# Patient Record
Sex: MALE | Race: BLACK OR AFRICAN AMERICAN | NOT HISPANIC OR LATINO | Employment: OTHER | URBAN - METROPOLITAN AREA
[De-identification: names, ages, dates, MRNs, and addresses within clinical notes are randomized per-mention and may not be internally consistent; named-entity substitution may affect disease eponyms.]

---

## 2017-11-10 ENCOUNTER — OFFICE VISIT (OUTPATIENT)
Dept: CARDIOLOGY | Facility: CLINIC | Age: 62
End: 2017-11-10
Payer: MEDICARE

## 2017-11-10 VITALS
BODY MASS INDEX: 41.52 KG/M2 | DIASTOLIC BLOOD PRESSURE: 78 MMHG | HEIGHT: 70 IN | HEART RATE: 66 BPM | SYSTOLIC BLOOD PRESSURE: 132 MMHG | WEIGHT: 290 LBS

## 2017-11-10 DIAGNOSIS — I25.2 OLD MI (MYOCARDIAL INFARCTION): ICD-10-CM

## 2017-11-10 DIAGNOSIS — E78.5 HYPERLIPIDEMIA, UNSPECIFIED HYPERLIPIDEMIA TYPE: ICD-10-CM

## 2017-11-10 DIAGNOSIS — Z98.61 S/P PTCA (PERCUTANEOUS TRANSLUMINAL CORONARY ANGIOPLASTY): ICD-10-CM

## 2017-11-10 DIAGNOSIS — I25.10 CORONARY ARTERY DISEASE INVOLVING NATIVE CORONARY ARTERY OF NATIVE HEART WITHOUT ANGINA PECTORIS: Primary | ICD-10-CM

## 2017-11-10 DIAGNOSIS — I10 ESSENTIAL HYPERTENSION: ICD-10-CM

## 2017-11-10 PROCEDURE — 99214 OFFICE O/P EST MOD 30 MIN: CPT | Mod: 25,S$GLB,, | Performed by: INTERNAL MEDICINE

## 2017-11-10 PROCEDURE — 99499 UNLISTED E&M SERVICE: CPT | Mod: S$GLB,,, | Performed by: INTERNAL MEDICINE

## 2017-11-10 PROCEDURE — 93000 ELECTROCARDIOGRAM COMPLETE: CPT | Mod: S$GLB,,, | Performed by: INTERNAL MEDICINE

## 2017-11-10 RX ORDER — ASPIRIN 81 MG/1
81 TABLET ORAL DAILY
COMMUNITY

## 2017-11-10 NOTE — PROGRESS NOTES
Subjective:   Patient ID:  Charles A Toussaint is a 62 y.o. male     Chief Complaint   Patient presents with    Pre-op Exam       HPI: Pt feels well .  Walks the dog.   Goes to [employer].    Pt is scheduled for skin cyst surgery by Dr Franklin  Review of Systems   Cardiovascular: Negative for chest pain, claudication, dyspnea on exertion, irregular heartbeat, leg swelling, near-syncope, orthopnea, palpitations and syncope.     Note that prior to last PTCA pt was having shortness of breath and jaw pain.     Pt had labs for [facility]  this week.   Objective:   Physical Exam   Constitutional: He is oriented to person, place, and time. He appears well-developed and well-nourished. No distress.   HENT:   Head: Normocephalic.   Eyes: No scleral icterus.   Neck: JVD present.   Cardiovascular: Normal rate, regular rhythm and normal heart sounds.  Exam reveals no gallop and no friction rub.    No murmur heard.  Pulmonary/Chest: Effort normal and breath sounds normal. No stridor.   Musculoskeletal: He exhibits no edema.   Neurological: He is alert and oriented to person, place, and time.   Skin: Skin is warm and dry. He is not diaphoretic.   Psychiatric: He has a normal mood and affect. His behavior is normal. Judgment and thought content normal.   Vitals reviewed.       Wt up 23 lbs.   ECG today--NSR, inferior infarct, low T waves.  Assessment:     1. Coronary artery disease involving native coronary artery of native heart without angina pectoris    2. Essential hypertension    3. Hyperlipidemia, unspecified hyperlipidemia type    4. Old MI (myocardial infarction)    5. S/P PTCA (percutaneous transluminal coronary angioplasty)        Plan:   Pt is medically stable for jaw cyst surgery  Discontinue Plavix since it has been 11 years since the placement of the last stent.  (1999 and 2006).  Rest of meds the same  It would be preferable to continue low dose ASA if possible in order to avoid stent  thrombosis.  RTC 6 months  Low carb diet explained in detail  Walk more

## 2017-11-13 NOTE — PROGRESS NOTES
Patient, Charles A Toussaint (MRN #[id number]), presented with a recorded BMI of 41.61 kg/m^2 consistent with the definition of morbid obesity (ICD-10 E66.01). The patient's morbid obesity was monitored, evaluated, addressed and/or treated. This addendum to the medical record is made on 11/13/2017.

## 2018-05-30 ENCOUNTER — OFFICE VISIT (OUTPATIENT)
Dept: CARDIOLOGY | Facility: CLINIC | Age: 63
End: 2018-05-30
Payer: MEDICARE

## 2018-05-30 VITALS
DIASTOLIC BLOOD PRESSURE: 78 MMHG | HEART RATE: 62 BPM | BODY MASS INDEX: 41.01 KG/M2 | WEIGHT: 286.5 LBS | HEIGHT: 70 IN | SYSTOLIC BLOOD PRESSURE: 132 MMHG

## 2018-05-30 DIAGNOSIS — I25.2 OLD MI (MYOCARDIAL INFARCTION): ICD-10-CM

## 2018-05-30 DIAGNOSIS — I25.10 CORONARY ARTERY DISEASE INVOLVING NATIVE CORONARY ARTERY OF NATIVE HEART WITHOUT ANGINA PECTORIS: ICD-10-CM

## 2018-05-30 DIAGNOSIS — E78.5 HYPERLIPIDEMIA, UNSPECIFIED HYPERLIPIDEMIA TYPE: ICD-10-CM

## 2018-05-30 DIAGNOSIS — Z98.61 S/P PTCA (PERCUTANEOUS TRANSLUMINAL CORONARY ANGIOPLASTY): ICD-10-CM

## 2018-05-30 DIAGNOSIS — I10 ESSENTIAL HYPERTENSION: Primary | ICD-10-CM

## 2018-05-30 PROCEDURE — 3008F BODY MASS INDEX DOCD: CPT | Mod: CPTII,S$GLB,, | Performed by: INTERNAL MEDICINE

## 2018-05-30 PROCEDURE — 99213 OFFICE O/P EST LOW 20 MIN: CPT | Mod: 25,S$GLB,, | Performed by: INTERNAL MEDICINE

## 2018-05-30 PROCEDURE — 3078F DIAST BP <80 MM HG: CPT | Mod: CPTII,S$GLB,, | Performed by: INTERNAL MEDICINE

## 2018-05-30 PROCEDURE — 93000 ELECTROCARDIOGRAM COMPLETE: CPT | Mod: S$GLB,,, | Performed by: INTERNAL MEDICINE

## 2018-05-30 PROCEDURE — 3075F SYST BP GE 130 - 139MM HG: CPT | Mod: CPTII,S$GLB,, | Performed by: INTERNAL MEDICINE

## 2018-05-30 RX ORDER — EZETIMIBE 10 MG
10 TABLET ORAL DAILY
Qty: 90 TABLET | Refills: 3 | Status: SHIPPED | OUTPATIENT
Start: 2018-05-30 | End: 2019-12-24

## 2018-05-30 RX ORDER — ATORVASTATIN CALCIUM 40 MG/1
40 TABLET, FILM COATED ORAL DAILY
COMMUNITY

## 2018-05-30 RX ORDER — PANTOPRAZOLE SODIUM 20 MG/1
20 TABLET, DELAYED RELEASE ORAL DAILY
Refills: 0 | COMMUNITY
Start: 2018-04-30

## 2018-05-30 RX ORDER — ATORVASTATIN CALCIUM 40 MG/1
TABLET, FILM COATED ORAL
Refills: 0 | COMMUNITY
Start: 2018-04-17 | End: 2018-05-30

## 2018-05-30 RX ORDER — CARVEDILOL 6.25 MG/1
6.25 TABLET ORAL 2 TIMES DAILY
Qty: 180 TABLET | Refills: 3 | Status: SHIPPED | OUTPATIENT
Start: 2018-05-30 | End: 2023-06-14

## 2018-05-30 RX ORDER — LOSARTAN POTASSIUM 50 MG/1
50 TABLET ORAL DAILY
Qty: 90 TABLET | Refills: 3 | Status: SHIPPED | OUTPATIENT
Start: 2018-05-30

## 2018-05-30 NOTE — PROGRESS NOTES
Patient, Charles A Toussaint (MRN #[id number]), presented with a recorded BMI of 41.11 kg/m^2 consistent with the definition of morbid obesity (ICD-10 E66.01). The patient's morbid obesity was monitored, evaluated, addressed and/or treated. This addendum to the medical record is made on 05/30/2018.

## 2018-05-30 NOTE — PROGRESS NOTES
"  Subjective:      Patient ID: Charles A Toussaint is a 63 y.o. male.    Chief Complaint: Follow-up    HPI:  [employer] graduated in [place].   Never had cyst surgery in jaw.  Active  Walks a lot  Mows the lawn.   Does yardwork.    Review of Systems   Cardiovascular: Negative for chest pain, claudication, dyspnea on exertion, irregular heartbeat, leg swelling, near-syncope, orthopnea, palpitations and syncope.      "I do not have the energy and motivation I used to have."    Snores    No daytime somnolence    Stopped smoking    Past Medical History:   Diagnosis Date    Coronary artery disease     Hyperlipidemia     Hypertension     MI (myocardial infarction)     1999        Past Surgical History:   Procedure Laterality Date    CARDIAC CATHETERIZATION      CORONARY ANGIOPLASTY      1999 at [place],  2006 at [facility] in [place]    HERNIA REPAIR Right     1990  inguinal       Family History   Problem Relation Age of Onset    Breast cancer Mother     Hypertension Sister     Heart disease Brother     Coronary artery disease Brother     Hypertension Brother        Social History     Social History    Marital status:      Spouse name: N/A    Number of children: N/A    Years of education: N/A     Social History Main Topics    Smoking status: Former Smoker     Packs/day: 0.25     Types: Cigarettes     Quit date: 10/10/2016    Smokeless tobacco: Never Used    Alcohol use 0.0 oz/week    Drug use: Unknown    Sexual activity: Not Asked     Other Topics Concern    None     Social History Narrative    None       Current Outpatient Prescriptions on File Prior to Visit   Medication Sig Dispense Refill    amlodipine (NORVASC) 5 MG tablet TK 1 T PO QD  2    aspirin (ECOTRIN) 81 MG EC tablet Take 81 mg by mouth once daily.      hydrocodone-acetaminophen 5-325mg (NORCO) 5-325 mg per tablet TK 1 T PO  Q 4 H PRN P  0    [DISCONTINUED] carvedilol (COREG) 6.25 MG tablet TK 1 T PO  BID.  3    " "[DISCONTINUED] losartan (COZAAR) 50 MG tablet Take 50 mg by mouth once daily.      [DISCONTINUED] ZETIA 10 mg tablet TK 1 T PO  QD.  3    [DISCONTINUED] atorvastatin (LIPITOR) 20 MG tablet TK 1 T PO  QD.  3    [DISCONTINUED] FLUVIRIN [id number] 45 mcg (15 mcg x 3)/0.5 mL Susp ADM 0.5ML IM UTD  0    [DISCONTINUED] pantoprazole (PROTONIX) 40 MG tablet TK 1 T PO QD  2     No current facility-administered medications on file prior to visit.        Review of patient's allergies indicates:  No Known Allergies  Objective:     Vitals:    05/30/18 1107   BP: 132/78   BP Location: Left arm   Patient Position: Sitting   BP Method: Large (Automatic)   Pulse: 62   Weight: 130 kg (286 lb 8 oz)   Height: 5' 10" (1.778 m)        Physical Exam   Constitutional: He is oriented to person, place, and time. He appears well-developed and well-nourished. No distress.   Eyes: No scleral icterus.   Neck: No JVD present. Carotid bruit is not present.   Cardiovascular: Regular rhythm and normal heart sounds.  Exam reveals no gallop and no friction rub.    No murmur heard.  Pulmonary/Chest: Effort normal and breath sounds normal. No respiratory distress.   Musculoskeletal: He exhibits no edema.   Neurological: He is alert and oriented to person, place, and time.   Skin: Skin is warm and dry. He is not diaphoretic.   Psychiatric: He has a normal mood and affect. His behavior is normal. Judgment and thought content normal.   Vitals reviewed.     Wt down 3.5 lbs  ECG: NSR, WNL  Assessment:     1. Essential hypertension    2. Coronary artery disease involving native coronary artery of native heart without angina pectoris    3. S/P PTCA (percutaneous transluminal coronary angioplasty)    4. Hyperlipidemia, unspecified hyperlipidemia type    5. Old MI (myocardial infarction)      Plan:   Brian was seen today for follow-up.    Diagnoses and all orders for this visit:    Essential hypertension    Coronary artery disease involving native coronary " artery of native heart without angina pectoris  -     EKG 12-lead    S/P PTCA (percutaneous transluminal coronary angioplasty)    Hyperlipidemia, unspecified hyperlipidemia type    Old MI (myocardial infarction)    Other orders  -     losartan (COZAAR) 50 MG tablet; Take 1 tablet (50 mg total) by mouth once daily.  -     carvedilol (COREG) 6.25 MG tablet; Take 1 tablet (6.25 mg total) by mouth 2 (two) times daily.  -     ZETIA 10 mg tablet; Take 1 tablet (10 mg total) by mouth once daily.     Same meds    Restrict carbs    Walk more in mall    Follow-up in about 6 months (around 11/30/2018).     F/u with dr Sosa who does labs

## 2019-08-21 ENCOUNTER — OFFICE VISIT (OUTPATIENT)
Dept: CARDIOLOGY | Facility: CLINIC | Age: 64
End: 2019-08-21
Payer: MEDICARE

## 2019-08-21 VITALS
BODY MASS INDEX: 41.23 KG/M2 | HEART RATE: 59 BPM | WEIGHT: 288 LBS | SYSTOLIC BLOOD PRESSURE: 133 MMHG | DIASTOLIC BLOOD PRESSURE: 79 MMHG | HEIGHT: 70 IN

## 2019-08-21 DIAGNOSIS — I10 ESSENTIAL HYPERTENSION: ICD-10-CM

## 2019-08-21 DIAGNOSIS — E78.00 PURE HYPERCHOLESTEROLEMIA: ICD-10-CM

## 2019-08-21 DIAGNOSIS — I25.2 OLD MI (MYOCARDIAL INFARCTION): ICD-10-CM

## 2019-08-21 DIAGNOSIS — I25.10 CORONARY ARTERY DISEASE INVOLVING NATIVE CORONARY ARTERY OF NATIVE HEART WITHOUT ANGINA PECTORIS: ICD-10-CM

## 2019-08-21 DIAGNOSIS — Z98.61 S/P PTCA (PERCUTANEOUS TRANSLUMINAL CORONARY ANGIOPLASTY): Primary | ICD-10-CM

## 2019-08-21 DIAGNOSIS — Z98.61 POST PTCA: ICD-10-CM

## 2019-08-21 DIAGNOSIS — E11.9 DIABETES MELLITUS WITHOUT COMPLICATION: ICD-10-CM

## 2019-08-21 DIAGNOSIS — I82.5Z2 CHRONIC DEEP VEIN THROMBOSIS (DVT) OF DISTAL VEIN OF LEFT LOWER EXTREMITY: ICD-10-CM

## 2019-08-21 PROBLEM — I82.5Z9 CHRONIC DEEP VEIN THROMBOSIS (DVT) OF DISTAL VEIN OF LOWER EXTREMITY: Status: ACTIVE | Noted: 2019-08-21

## 2019-08-21 PROCEDURE — 3075F PR MOST RECENT SYSTOLIC BLOOD PRESS GE 130-139MM HG: ICD-10-PCS | Mod: CPTII,S$GLB,, | Performed by: INTERNAL MEDICINE

## 2019-08-21 PROCEDURE — 3008F BODY MASS INDEX DOCD: CPT | Mod: CPTII,S$GLB,, | Performed by: INTERNAL MEDICINE

## 2019-08-21 PROCEDURE — 3008F PR BODY MASS INDEX (BMI) DOCUMENTED: ICD-10-PCS | Mod: CPTII,S$GLB,, | Performed by: INTERNAL MEDICINE

## 2019-08-21 PROCEDURE — 93000 ELECTROCARDIOGRAM COMPLETE: CPT | Mod: S$GLB,,, | Performed by: INTERNAL MEDICINE

## 2019-08-21 PROCEDURE — 99499 RISK ADDL DX/OHS AUDIT: ICD-10-PCS | Mod: S$GLB,,, | Performed by: INTERNAL MEDICINE

## 2019-08-21 PROCEDURE — 99214 OFFICE O/P EST MOD 30 MIN: CPT | Mod: S$GLB,,, | Performed by: INTERNAL MEDICINE

## 2019-08-21 PROCEDURE — 93000 EKG 12-LEAD: ICD-10-PCS | Mod: S$GLB,,, | Performed by: INTERNAL MEDICINE

## 2019-08-21 PROCEDURE — 99214 PR OFFICE/OUTPT VISIT, EST, LEVL IV, 30-39 MIN: ICD-10-PCS | Mod: S$GLB,,, | Performed by: INTERNAL MEDICINE

## 2019-08-21 PROCEDURE — 99499 UNLISTED E&M SERVICE: CPT | Mod: S$GLB,,, | Performed by: INTERNAL MEDICINE

## 2019-08-21 PROCEDURE — 3078F DIAST BP <80 MM HG: CPT | Mod: CPTII,S$GLB,, | Performed by: INTERNAL MEDICINE

## 2019-08-21 PROCEDURE — 3078F PR MOST RECENT DIASTOLIC BLOOD PRESSURE < 80 MM HG: ICD-10-PCS | Mod: CPTII,S$GLB,, | Performed by: INTERNAL MEDICINE

## 2019-08-21 PROCEDURE — 3075F SYST BP GE 130 - 139MM HG: CPT | Mod: CPTII,S$GLB,, | Performed by: INTERNAL MEDICINE

## 2019-08-21 RX ORDER — LATANOPROST 50 UG/ML
2 SOLUTION/ DROPS OPHTHALMIC
COMMUNITY

## 2019-08-21 RX ORDER — GABAPENTIN 300 MG/1
300 CAPSULE ORAL DAILY PRN
Refills: 2 | COMMUNITY
Start: 2019-08-09

## 2019-08-21 RX ORDER — METFORMIN HYDROCHLORIDE 500 MG/1
500 TABLET, EXTENDED RELEASE ORAL DAILY
Refills: 1 | COMMUNITY
Start: 2019-08-07

## 2019-08-21 RX ORDER — APIXABAN 5 MG/1
5 TABLET, FILM COATED ORAL 2 TIMES DAILY
Refills: 1 | COMMUNITY
Start: 2019-08-09 | End: 2020-01-15

## 2019-08-21 RX ORDER — IBUPROFEN 800 MG/1
TABLET ORAL
Refills: 0 | COMMUNITY
Start: 2019-07-11 | End: 2019-08-21

## 2019-08-21 NOTE — PROGRESS NOTES
Patient, Charles A Toussaint (MRN #[id number]), presented with a recorded BMI of 41.32 kg/m^2 consistent with the definition of morbid obesity (ICD-10 E66.01). The patient's morbid obesity was monitored, evaluated, addressed and/or treated. This addendum to the medical record is made on 08/21/2019.

## 2019-08-21 NOTE — PROGRESS NOTES
"  Subjective:      Patient ID: Charles A Toussaint is a 64 y.o. male.    Chief Complaint: Follow-up (Dx w/stage 3 colon cancer in August 2018)    HPI:  Pt had a partial colectomy for CA colon stage III and underwent chemotherapy.  Last scan showed no evidence of disease.    Pt has numbness of left anterior thigh ever since surgery    Pt has left sided sciatica worse in certain positions.     Pt was begun on gabapentin with not much relief.    Pt was referred to a neurosurgeon at [facility] for a pinched nerve.    Pt is able to walk "but it is painful in left buttock and left calf.    Pt went on disability in 1996 for back and disc problems.    Pt had blood clots in legs during chemotherapy and was put on Eliquis at [facility].    Pt developed chest pains and chemotherapy during chemotherapy and required another coronary stent at [facility].    Review of Systems   Cardiovascular: Positive for chest pain (Prior to most recent PTCA and stent at [facility].  No chest pain since last PTCA), dyspnea on exertion (Prior to last PTCA pt was short of breath but not since most recent PTCA) and leg swelling (Both feet were swollen at the time of blood clots in both legs butfeet are not swellin at the present time.). Negative for claudication, irregular heartbeat, near-syncope, orthopnea, palpitations and syncope.      New PCP is at [facility]--Dr Tootie Canales.  Past Medical History:   Diagnosis Date    Coronary artery disease     Hyperlipidemia     Hypertension     MI (myocardial infarction)     1999        Past Surgical History:   Procedure Laterality Date    CARDIAC CATHETERIZATION      CORONARY ANGIOPLASTY      1999 at [place],  2006 at [facility] in [place]    HERNIA REPAIR Right     1990  inguinal       Family History   Problem Relation Age of Onset    Breast cancer Mother     Hypertension Sister     Heart disease Brother     Coronary artery disease Brother     " Hypertension Brother        Social History     Socioeconomic History    Marital status:      Spouse name: Not on file    Number of children: Not on file    Years of education: Not on file    Highest education level: Not on file   Occupational History    Not on file   Social Needs    Financial resource strain: Not on file    Food insecurity:     Worry: Not on file     Inability: Not on file    Transportation needs:     Medical: Not on file     Non-medical: Not on file   Tobacco Use    Smoking status: Former Smoker     Packs/day: 0.25     Types: Cigarettes     Last attempt to quit: 10/10/2016     Years since quittin.8    Smokeless tobacco: Never Used   Substance and Sexual Activity    Alcohol use: Yes     Alcohol/week: 0.0 oz    Drug use: Not on file    Sexual activity: Not on file   Lifestyle    Physical activity:     Days per week: Not on file     Minutes per session: Not on file    Stress: Not on file   Relationships    Social connections:     Talks on phone: Not on file     Gets together: Not on file     Attends Oriental orthodox service: Not on file     Active member of club or organization: Not on file     Attends meetings of clubs or organizations: Not on file     Relationship status: Not on file   Other Topics Concern    Not on file   Social History Narrative    Not on file       Current Outpatient Medications on File Prior to Visit   Medication Sig Dispense Refill    amlodipine (NORVASC) 5 MG tablet TK 1 T PO QD  2    aspirin (ECOTRIN) 81 MG EC tablet Take 81 mg by mouth once daily.      atorvastatin (LIPITOR) 40 MG tablet Take 40 mg by mouth once daily.      carvedilol (COREG) 6.25 MG tablet Take 1 tablet (6.25 mg total) by mouth 2 (two) times daily. 180 tablet 3    ELIQUIS 5 mg Tab 5 mg 2 (two) times daily.   1    gabapentin (NEURONTIN) 300 MG capsule TK 2 CS PO TID FOR NERVE PAIN  2    latanoprost 0.005 % ophthalmic solution Apply 2 drops to eye.      losartan (COZAAR) 50 MG  "tablet Take 1 tablet (50 mg total) by mouth once daily. 90 tablet 3    metFORMIN (GLUCOPHAGE-XR) 500 MG 24 hr tablet once daily.  1    pantoprazole (PROTONIX) 20 MG tablet   0    ZETIA 10 mg tablet Take 1 tablet (10 mg total) by mouth once daily. 90 tablet 3    [DISCONTINUED] ibuprofen (ADVIL,MOTRIN) 800 MG tablet TK 1 T PO  Q 8 H PRN  0    [DISCONTINUED] hydrocodone-acetaminophen 5-325mg (NORCO) 5-325 mg per tablet TK 1 T PO  Q 4 H PRN P  0     No current facility-administered medications on file prior to visit.        Review of patient's allergies indicates:  No Known Allergies  Objective:     Vitals:    08/21/19 0838   BP: 133/79   BP Location: Left arm   Patient Position: Sitting   BP Method: Large (Automatic)   Pulse: (!) 59   Weight: 130.6 kg (288 lb)   Height: 5' 10" (1.778 m)        Physical Exam   Constitutional: He is oriented to person, place, and time. He appears well-developed and well-nourished. No distress.   Eyes: No scleral icterus.   Neck: No JVD present. Carotid bruit is not present.   Cardiovascular: Regular rhythm and normal heart sounds. Exam reveals no gallop and no friction rub.   No murmur heard.  Pulmonary/Chest: Effort normal and breath sounds normal. No respiratory distress.   Musculoskeletal: He exhibits edema (trace pitting pedal edema bilaterally).   Neurological: He is alert and oriented to person, place, and time.   Skin: Skin is warm and dry. He is not diaphoretic.   Psychiatric: He has a normal mood and affect. His behavior is normal. Judgment and thought content normal.   Vitals reviewed.  wt up 2 lbs over past year     ECG: sinus bradycardia at 57 bpm, early repolarization syndrome.    [facility] records reviewed.  Venous ultrasound in March showed left peroneal thrombus  CTA 2/2/19 showed no PE    Pt had POBA/PCI of in-stent restenosis of Left anterior descending coronary artery 2/5/19.   Pt was advised to take ASA forever and Plavix for a year.  The Plavix was " discontinued once pt was begun on Eliquis.  Pt was felt to possibly require brachytherapy in the future for in-stent overlapping stent thrombosis.    Note lab 8/1/19:  Hgb 12.7  plt 199  WBC 5.9  CMP WNL except glu 116  Assessment:     1. S/P PTCA (percutaneous transluminal coronary angioplasty)    2. Old MI (myocardial infarction)    3. Pure hypercholesterolemia    4. Essential hypertension    5. Coronary artery disease involving native coronary artery of native heart without angina pectoris    6. Diabetes mellitus without complication    7. Chronic deep vein thrombosis (DVT) of distal vein of left lower extremity    8. Post PTCA      Plan:   Brian was seen today for follow-up.    Diagnoses and all orders for this visit:    S/P PTCA (percutaneous transluminal coronary angioplasty)  -     IN OFFICE EKG 12-LEAD (to Muse)    Old MI (myocardial infarction)    Pure hypercholesterolemia    Essential hypertension    Coronary artery disease involving native coronary artery of native heart without angina pectoris    Diabetes mellitus without complication    Chronic deep vein thrombosis (DVT) of distal vein of left lower extremity    Post PTCA  -     IN OFFICE EKG 12-LEAD (to Muse)     Discontinue the ibuprofen and avoid all NSAID's due to risk of bleeding with Eliquis.   Substitute Tylenol prn.    Pt has an appt with neurosurgeon in September--Dr Surjit Yeung.    Discussed option of epidural steroid shots and physical therapy for left sciatica    Will ask oncologist the anticipated duration of Eliquis--Dr Parker Phillips--pt has appt 9/26/19.  Pt was placed on Eliquis back 2/19. Would discontinue the Eliquis if OK with Dr Phillips (If pt is not considered hypercoagulable).  If Eliquis is discontinued then pt should resume the Plavix along with the ASA through February of 2020    Note that if Eliquis is discontinued pt may be able to take NSAID's again for his sciatica    I have explained to pt that there is a risk of  "stent thrombosis and heart attack if the ASA (and Eliquis or Plavix) is held for submandibular mass surgery (elective, "it is not cancer") or for epidural steroid injections or for back surgery.  The risk of stent thrombosis and heart attack for any medically necessary procedure would be minimized if pt were able to take ASA perioperatively    I have discussed with pt the potential need for repeat stress test if chest pains or shortness of breath recur and potential need for repeat PTCA and/or brachytherapy.    Would do Lexiscan Cardiolite stress prior to any elective procedure.    ADA wt reducing diet discussed.  Pt has a new diet plan from dietician    Same meds for now    Follow up in about 3 months (around 11/21/2019).  "

## 2019-12-12 ENCOUNTER — TELEPHONE (OUTPATIENT)
Dept: CARDIOLOGY | Facility: CLINIC | Age: 64
End: 2019-12-12

## 2019-12-12 NOTE — TELEPHONE ENCOUNTER
Patient need to have a colonoscopy ASAP, does he need a Lexiscan prior to colonoscopy if so please order. Patient had an appointment for 12/13/19 but we had to R/S for 01/08/2020. Thanks.

## 2019-12-24 RX ORDER — EZETIMIBE 10 MG
TABLET ORAL
Qty: 90 TABLET | Refills: 3 | Status: SHIPPED | OUTPATIENT
Start: 2019-12-24

## 2020-01-15 ENCOUNTER — OFFICE VISIT (OUTPATIENT)
Dept: CARDIOLOGY | Facility: CLINIC | Age: 65
End: 2020-01-15
Payer: MEDICARE

## 2020-01-15 VITALS
HEART RATE: 69 BPM | BODY MASS INDEX: 44.09 KG/M2 | SYSTOLIC BLOOD PRESSURE: 125 MMHG | WEIGHT: 308 LBS | HEIGHT: 70 IN | DIASTOLIC BLOOD PRESSURE: 77 MMHG

## 2020-01-15 DIAGNOSIS — I25.10 CORONARY ARTERY DISEASE INVOLVING NATIVE CORONARY ARTERY OF NATIVE HEART WITHOUT ANGINA PECTORIS: ICD-10-CM

## 2020-01-15 DIAGNOSIS — I82.5Z9 CHRONIC DEEP VEIN THROMBOSIS (DVT) OF DISTAL VEIN OF LOWER EXTREMITY, UNSPECIFIED LATERALITY: ICD-10-CM

## 2020-01-15 DIAGNOSIS — E78.00 PURE HYPERCHOLESTEROLEMIA: ICD-10-CM

## 2020-01-15 DIAGNOSIS — Z98.61 S/P PTCA (PERCUTANEOUS TRANSLUMINAL CORONARY ANGIOPLASTY): Primary | ICD-10-CM

## 2020-01-15 DIAGNOSIS — Z98.61 POST PTCA: ICD-10-CM

## 2020-01-15 DIAGNOSIS — Z98.61 POST PTCA: Primary | ICD-10-CM

## 2020-01-15 DIAGNOSIS — I10 ESSENTIAL HYPERTENSION: ICD-10-CM

## 2020-01-15 DIAGNOSIS — E11.9 DIABETES MELLITUS WITHOUT COMPLICATION: ICD-10-CM

## 2020-01-15 DIAGNOSIS — I25.2 OLD MI (MYOCARDIAL INFARCTION): ICD-10-CM

## 2020-01-15 PROCEDURE — 3078F DIAST BP <80 MM HG: CPT | Mod: CPTII,S$GLB,, | Performed by: INTERNAL MEDICINE

## 2020-01-15 PROCEDURE — 3074F SYST BP LT 130 MM HG: CPT | Mod: CPTII,S$GLB,, | Performed by: INTERNAL MEDICINE

## 2020-01-15 PROCEDURE — 3074F PR MOST RECENT SYSTOLIC BLOOD PRESSURE < 130 MM HG: ICD-10-PCS | Mod: CPTII,S$GLB,, | Performed by: INTERNAL MEDICINE

## 2020-01-15 PROCEDURE — 3008F PR BODY MASS INDEX (BMI) DOCUMENTED: ICD-10-PCS | Mod: CPTII,S$GLB,, | Performed by: INTERNAL MEDICINE

## 2020-01-15 PROCEDURE — 99214 PR OFFICE/OUTPT VISIT, EST, LEVL IV, 30-39 MIN: ICD-10-PCS | Mod: 25,S$GLB,, | Performed by: INTERNAL MEDICINE

## 2020-01-15 PROCEDURE — 93000 ELECTROCARDIOGRAM COMPLETE: CPT | Mod: S$GLB,,, | Performed by: INTERNAL MEDICINE

## 2020-01-15 PROCEDURE — 3008F BODY MASS INDEX DOCD: CPT | Mod: CPTII,S$GLB,, | Performed by: INTERNAL MEDICINE

## 2020-01-15 PROCEDURE — 3078F PR MOST RECENT DIASTOLIC BLOOD PRESSURE < 80 MM HG: ICD-10-PCS | Mod: CPTII,S$GLB,, | Performed by: INTERNAL MEDICINE

## 2020-01-15 PROCEDURE — 99214 OFFICE O/P EST MOD 30 MIN: CPT | Mod: 25,S$GLB,, | Performed by: INTERNAL MEDICINE

## 2020-01-15 PROCEDURE — 93000 EKG 12-LEAD: ICD-10-PCS | Mod: S$GLB,,, | Performed by: INTERNAL MEDICINE

## 2020-01-15 RX ORDER — IBUPROFEN 800 MG/1
TABLET ORAL
COMMUNITY
Start: 2019-11-11

## 2020-01-15 NOTE — PROGRESS NOTES
Subjective:      Patient ID: Charles A Toussaint is a 64 y.o. male.    Chief Complaint: No chief complaint on file.    HPI:    ROS     Past Medical History:   Diagnosis Date    Coronary artery disease     Hyperlipidemia     Hypertension     MI (myocardial infarction)     1999        Past Surgical History:   Procedure Laterality Date    CARDIAC CATHETERIZATION      CORONARY ANGIOPLASTY      1999 at [place],  2006 at [facility] [place]    HERNIA REPAIR Right     1990  inguinal       Family History   Problem Relation Age of Onset    Breast cancer Mother     Hypertension Sister     Heart disease Brother     Coronary artery disease Brother     Hypertension Brother        Social History     Socioeconomic History    Marital status:      Spouse name: Not on file    Number of children: Not on file    Years of education: Not on file    Highest education level: Not on file   Occupational History    Not on file   Social Needs    Financial resource strain: Not on file    Food insecurity:     Worry: Not on file     Inability: Not on file    Transportation needs:     Medical: Not on file     Non-medical: Not on file   Tobacco Use    Smoking status: Former Smoker     Packs/day: 0.25     Types: Cigarettes     Last attempt to quit: 10/10/2016     Years since quitting: 3.2    Smokeless tobacco: Never Used   Substance and Sexual Activity    Alcohol use: Yes     Alcohol/week: 0.0 standard drinks    Drug use: Not on file    Sexual activity: Not on file   Lifestyle    Physical activity:     Days per week: Not on file     Minutes per session: Not on file    Stress: Not on file   Relationships    Social connections:     Talks on phone: Not on file     Gets together: Not on file     Attends Oriental orthodox service: Not on file     Active member of club or organization: Not on file     Attends meetings of clubs or organizations: Not on file     Relationship status: Not on file   Other Topics Concern    Not  on file   Social History Narrative    Not on file       Current Outpatient Medications on File Prior to Visit   Medication Sig Dispense Refill    amlodipine (NORVASC) 5 MG tablet TK 1 T PO QD  2    aspirin (ECOTRIN) 81 MG EC tablet Take 81 mg by mouth once daily.      atorvastatin (LIPITOR) 40 MG tablet Take 40 mg by mouth once daily.      carvedilol (COREG) 6.25 MG tablet Take 1 tablet (6.25 mg total) by mouth 2 (two) times daily. 180 tablet 3    ELIQUIS 5 mg Tab 5 mg 2 (two) times daily.   1    gabapentin (NEURONTIN) 300 MG capsule TK 2 CS PO TID FOR NERVE PAIN  2    ibuprofen (ADVIL,MOTRIN) 800 MG tablet       latanoprost 0.005 % ophthalmic solution Apply 2 drops to eye.      losartan (COZAAR) 50 MG tablet Take 1 tablet (50 mg total) by mouth once daily. 90 tablet 3    metFORMIN (GLUCOPHAGE-XR) 500 MG 24 hr tablet once daily.  1    pantoprazole (PROTONIX) 20 MG tablet   0    ZETIA 10 mg tablet TAKE 1 TABLET(10 MG) BY MOUTH EVERY DAY 90 tablet 3     No current facility-administered medications on file prior to visit.        Review of patient's allergies indicates:  No Known Allergies  Objective:   There were no vitals filed for this visit.     Physical Exam     Assessment:     1. Post PTCA      Plan:   Diagnoses and all orders for this visit:    Post PTCA  -     NM Myocardial Perfusion Spect Multi Pharmacologic; Future  -     Exercise Stress - EKG; Future         No follow-ups on file.

## 2020-01-15 NOTE — PROGRESS NOTES
"  Subjective:      Patient ID: Charles A Toussaint is a 64 y.o. male.    Chief Complaint: Pre-op Exam (Lipoma Excision w/biospy by Dr Leonor Mahmood. 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    Pt walks up and down 2 flights of steps twice a day to strengthen knees.    [place]    Colonoscopy was cancelled pending cardiac clearance.    Pt dx with colon cancer and colonoscopy is scheduled annually.    Pt is scheduling removal of chin lipoma by Dr Ela JOHNSTON at [facility] is requesting repeat stress test prior to colonoscopy    Review of Systems   Cardiovascular: Negative for chest pain, claudication, dyspnea on exertion, irregular heartbeat, leg swelling, near-syncope, orthopnea, palpitations and syncope.        Past Medical History:   Diagnosis Date    Coronary artery disease     Hyperlipidemia     Hypertension     MI (myocardial infarction)     1999        Past Surgical History:   Procedure Laterality Date    CARDIAC CATHETERIZATION      CORONARY ANGIOPLASTY      1999 at [place],  2006 at [facility] in [place]    HERNIA REPAIR Right     1990  inguinal       Family History   Problem Relation Age of Onset    Breast cancer Mother     Hypertension Sister     Heart disease Brother     Coronary artery disease Brother     Hypertension Brother        Social History     Socioeconomic History    Marital status:      Spouse name: Not on file    Number of children: Not on file    Years of education: Not on file    Highest education level: Not on file   Occupational History    Not on file   Social Needs    Financial resource strain: Not on file    Food insecurity:     Worry: Not on file     Inability: Not on file    Transportation needs:     " Medical: Not on file     Non-medical: Not on file   Tobacco Use    Smoking status: Former Smoker     Packs/day: 0.25     Types: Cigarettes     Last attempt to quit: 10/10/2016     Years since quitting: 3.2    Smokeless tobacco: Never Used   Substance and Sexual Activity    Alcohol use: Yes     Alcohol/week: 0.0 standard drinks    Drug use: Not on file    Sexual activity: Not on file   Lifestyle    Physical activity:     Days per week: Not on file     Minutes per session: Not on file    Stress: Not on file   Relationships    Social connections:     Talks on phone: Not on file     Gets together: Not on file     Attends Confucianism service: Not on file     Active member of club or organization: Not on file     Attends meetings of clubs or organizations: Not on file     Relationship status: Not on file   Other Topics Concern    Not on file   Social History Narrative    Not on file       Current Outpatient Medications on File Prior to Visit   Medication Sig Dispense Refill    amlodipine (NORVASC) 5 MG tablet TK 1 T PO QD  2    aspirin (ECOTRIN) 81 MG EC tablet Take 81 mg by mouth once daily.      atorvastatin (LIPITOR) 40 MG tablet Take 40 mg by mouth once daily.      carvedilol (COREG) 6.25 MG tablet Take 1 tablet (6.25 mg total) by mouth 2 (two) times daily. 180 tablet 3    gabapentin (NEURONTIN) 300 MG capsule TK 2 CS PO TID FOR NERVE PAIN  2    ibuprofen (ADVIL,MOTRIN) 800 MG tablet       latanoprost 0.005 % ophthalmic solution Apply 2 drops to eye.      losartan (COZAAR) 50 MG tablet Take 1 tablet (50 mg total) by mouth once daily. 90 tablet 3    metFORMIN (GLUCOPHAGE-XR) 500 MG 24 hr tablet once daily.  1    pantoprazole (PROTONIX) 20 MG tablet   0    ZETIA 10 mg tablet TAKE 1 TABLET(10 MG) BY MOUTH EVERY DAY 90 tablet 3    [DISCONTINUED] ELIQUIS 5 mg Tab 5 mg 2 (two) times daily.   1     No current facility-administered medications on file prior to visit.        Review of patient's allergies  "indicates:  No Known Allergies  Objective:     Vitals:    01/15/20 0945   BP: 125/77   BP Location: Left arm   Patient Position: Sitting   BP Method: Large (Automatic)   Pulse: 69   Weight: (!) 139.7 kg (308 lb)   Height: 5' 10" (1.778 m)        Physical Exam   Constitutional: He is oriented to person, place, and time. He appears well-developed and well-nourished. No distress.   Eyes: No scleral icterus.   Neck: No JVD present. Carotid bruit is not present.   Cardiovascular: Regular rhythm and normal heart sounds. Exam reveals no gallop and no friction rub.   No murmur heard.  Pulmonary/Chest: Effort normal and breath sounds normal. No respiratory distress.   Musculoskeletal: He exhibits no edema.   Neurological: He is alert and oriented to person, place, and time.   Skin: Skin is warm and dry. He is not diaphoretic.   Psychiatric: He has a normal mood and affect. His behavior is normal. Judgment and thought content normal.   Vitals reviewed.     Wt up 20 lbs since 8/19    ECG: NSR, small Q waves in inferior leads, possible LVH, ST segment elevation due to early repolarization      Assessment:     1. S/P PTCA (percutaneous transluminal coronary angioplasty)    2. Old MI (myocardial infarction)    3. Pure hypercholesterolemia    4. Essential hypertension    5. Coronary artery disease involving native coronary artery of native heart without angina pectoris    6. Chronic deep vein thrombosis (DVT) of distal vein of lower extremity, unspecified laterality    7. Diabetes mellitus without complication      Plan:   Brian was seen today for pre-op exam.    Diagnoses and all orders for this visit:    S/P PTCA (percutaneous transluminal coronary angioplasty)    Old MI (myocardial infarction)    Pure hypercholesterolemia    Essential hypertension    Coronary artery disease involving native coronary artery of native heart without angina pectoris    Chronic deep vein thrombosis (DVT) of distal vein of lower extremity, " unspecified laterality    Diabetes mellitus without complication    Other orders  -     IN OFFICE EKG 12-LEAD (to [place])     Will proceed with Lexiscan Cardiolite stress test prior to elective chin surgery to remove lipoma.    Pt is medically stable for colonoscopy and facial surgery by Dr Mahmood    There is a small risk of stent thrombosis and heart attack if the ASA  is held for procedures which has to be weighed against the benefit of the procedures and the risk of bleeding if the procedures are performed while taking anti-platelet meds.    Pt completed his course of Eliquis for DVT      Low carb weight reducing diet discussed in detail    Walk more    Follow up in about 6 months (around 7/15/2020).

## 2020-01-15 NOTE — PROGRESS NOTES
Patient, Charles A Toussaint (MRN #[id number]), presented with a recorded BMI of 44.19 kg/m^2 consistent with the definition of morbid obesity (ICD-10 E66.01). The patient's morbid obesity was monitored, evaluated, addressed and/or treated. This addendum to the medical record is made on 01/15/2020.

## 2020-01-27 ENCOUNTER — TELEPHONE (OUTPATIENT)
Dept: CARDIOLOGY | Facility: CLINIC | Age: 65
End: 2020-01-27

## 2020-01-27 NOTE — TELEPHONE ENCOUNTER
Reached out to patient want to cancel his lexiscan stress test due to colonoscopy being cancelled due to out of network.    ----- Message from Brandy Connelly sent at 1/27/2020  3:39 PM CST -----  Contact: [phone]/self  Patient is requesting to speak with you regarding rescheduling a procedure. Please advise.

## 2020-03-05 DIAGNOSIS — Z98.61 POSTSURGICAL PERCUTANEOUS TRANSLUMINAL CORONARY ANGIOPLASTY STATUS: Primary | ICD-10-CM

## 2020-03-06 ENCOUNTER — HOSPITAL ENCOUNTER (OUTPATIENT)
Dept: RADIOLOGY | Facility: OTHER | Age: 65
Discharge: HOME | End: 2020-03-06
Attending: INTERNAL MEDICINE
Payer: MEDICARE

## 2020-03-06 ENCOUNTER — HOSPITAL ENCOUNTER (OUTPATIENT)
Dept: CARDIOLOGY | Facility: OTHER | Age: 65
Discharge: HOME | End: 2020-03-06
Attending: INTERNAL MEDICINE
Payer: MEDICARE

## 2020-03-06 DIAGNOSIS — Z98.61 POST PTCA: ICD-10-CM

## 2020-03-12 DIAGNOSIS — Z98.61 POSTSURGICAL PERCUTANEOUS TRANSLUMINAL CORONARY ANGIOPLASTY STATUS: Primary | ICD-10-CM

## 2020-03-13 ENCOUNTER — HOSPITAL ENCOUNTER (OUTPATIENT)
Dept: CARDIOLOGY | Facility: OTHER | Age: 65
Discharge: HOME | End: 2020-03-13
Attending: INTERNAL MEDICINE
Payer: MEDICARE

## 2020-03-13 ENCOUNTER — TELEPHONE (OUTPATIENT)
Dept: CARDIOLOGY | Facility: CLINIC | Age: 65
End: 2020-03-13

## 2020-03-13 ENCOUNTER — HOSPITAL ENCOUNTER (OUTPATIENT)
Dept: RADIOLOGY | Facility: OTHER | Age: 65
Discharge: HOME | End: 2020-03-13
Attending: INTERNAL MEDICINE
Payer: MEDICARE

## 2020-03-13 VITALS
SYSTOLIC BLOOD PRESSURE: 111 MMHG | HEIGHT: 70 IN | WEIGHT: 308 LBS | BODY MASS INDEX: 44.09 KG/M2 | HEART RATE: 55 BPM | DIASTOLIC BLOOD PRESSURE: 69 MMHG

## 2020-03-13 DIAGNOSIS — Z98.61 POSTSURGICAL PERCUTANEOUS TRANSLUMINAL CORONARY ANGIOPLASTY STATUS: ICD-10-CM

## 2020-03-13 LAB
CV PHARM DOSE: 0.4 MG
CV STRESS BASE HR: 55 BPM
DIASTOLIC BLOOD PRESSURE: 69 MMHG
OHS CV CPX 85 PERCENT MAX PREDICTED HEART RATE MALE: 134
OHS CV CPX MAX PREDICTED HEART RATE: 158
OHS CV CPX PATIENT IS FEMALE: 0
OHS CV CPX PATIENT IS MALE: 1
OHS CV CPX PEAK DIASTOLIC BLOOD PRESSURE: 73 MMHG
OHS CV CPX PEAK HEAR RATE: 80 BPM
OHS CV CPX PEAK RATE PRESSURE PRODUCT: 9600
OHS CV CPX PEAK SYSTOLIC BLOOD PRESSURE: 120 MMHG
OHS CV CPX PERCENT MAX PREDICTED HEART RATE ACHIEVED: 51
OHS CV CPX RATE PRESSURE PRODUCT PRESENTING: 6105
STRESS ECHO TARGET HR: 134 BPM
SYSTOLIC BLOOD PRESSURE: 111 MMHG

## 2020-03-13 PROCEDURE — 78452 NM MYOCARDIAL PERFUSION SPECT MULTI PHARM: ICD-10-PCS | Mod: 26,,, | Performed by: RADIOLOGY

## 2020-03-13 PROCEDURE — 78452 HT MUSCLE IMAGE SPECT MULT: CPT | Mod: 26,,, | Performed by: RADIOLOGY

## 2020-03-13 NOTE — TELEPHONE ENCOUNTER
Message left on voicemail:  Nuclear stress test is perfect.  No blockage.  Heart is strong.          (Patient is medically stable for colonoscopy.)

## 2020-12-30 ENCOUNTER — NURSE TRIAGE (OUTPATIENT)
Dept: ADMINISTRATIVE | Facility: CLINIC | Age: 65
End: 2020-12-30

## 2020-12-31 NOTE — TELEPHONE ENCOUNTER
Spoke with family: pt with fever earlier this am was 100.6F. this evening is 102.2F. no CP. Is able to walk around. No confusion,slurring or words or do=ifficulty staying awake. + urinary s/s. Last night had frequency. Took 2 x strength tylenol 1730-- reports mild lightheadedness 2 hrs ago was worse.  Pt with urinary s/s, fever. instructed pt to go to ED for evaluation. Wife verbalizes understanding.     Reason for Disposition   [1] Fever > 101 F (38.3 C) AND [2] age > 60    Additional Information   Negative: Bluish (or gray) lips or face now   Negative: Severe difficulty breathing (e.g., struggling for each breath, speaks in single words)   Negative: Sounds like a life-threatening emergency to the triager   Negative: [1] Difficulty breathing occurs AND [2] within 14 days of COVID-19 EXPOSURE (Close Contact)   Negative: Shock suspected (e.g., cold/pale/clammy skin, too weak to stand, low BP, rapid pulse)   Negative: Difficult to awaken or acting confused (e.g., disoriented, slurred speech)   Negative: [1] Difficulty breathing AND [2] bluish lips, tongue or face   Negative: New onset rash with multiple purple (or blood-colored) spots or dots   Negative: Sounds like a life-threatening emergency to the triager   Negative: Difficulty breathing   Negative: [1] Headache AND [2] stiff neck (can't touch chin to chest)   Negative: IV drug abuse   Negative: Fever > 104 F (40 C)   Negative: [1] Drinking very little AND [2] dehydration suspected (e.g., no urine > 12 hours, very dry mouth, very lightheaded)   Negative: Patient sounds very sick or weak to the triager  (Exception: mild weakness and hasn't taken fever medicine)    Protocols used: FEVER-A-AH, CORONAVIRUS (COVID-19) - EXPOSURE-A-AH

## 2021-04-22 ENCOUNTER — TELEPHONE (OUTPATIENT)
Dept: CARDIOLOGY | Facility: CLINIC | Age: 66
End: 2021-04-22

## 2021-05-03 ENCOUNTER — TELEPHONE (OUTPATIENT)
Dept: CARDIOLOGY | Facility: CLINIC | Age: 66
End: 2021-05-03

## 2021-05-03 ENCOUNTER — OFFICE VISIT (OUTPATIENT)
Dept: CARDIOLOGY | Facility: CLINIC | Age: 66
End: 2021-05-03
Payer: MEDICARE

## 2021-05-03 VITALS
HEIGHT: 70 IN | WEIGHT: 301.13 LBS | BODY MASS INDEX: 43.11 KG/M2 | HEART RATE: 72 BPM | DIASTOLIC BLOOD PRESSURE: 68 MMHG | SYSTOLIC BLOOD PRESSURE: 122 MMHG | OXYGEN SATURATION: 96 %

## 2021-05-03 DIAGNOSIS — I25.10 CORONARY ARTERY DISEASE INVOLVING NATIVE CORONARY ARTERY OF NATIVE HEART WITHOUT ANGINA PECTORIS: Primary | ICD-10-CM

## 2021-05-03 DIAGNOSIS — Z98.61 S/P PTCA (PERCUTANEOUS TRANSLUMINAL CORONARY ANGIOPLASTY): ICD-10-CM

## 2021-05-03 DIAGNOSIS — I10 ESSENTIAL HYPERTENSION: ICD-10-CM

## 2021-05-03 DIAGNOSIS — I25.2 OLD MI (MYOCARDIAL INFARCTION): ICD-10-CM

## 2021-05-03 DIAGNOSIS — I82.5Z9 CHRONIC DEEP VEIN THROMBOSIS (DVT) OF DISTAL VEIN OF LOWER EXTREMITY, UNSPECIFIED LATERALITY: ICD-10-CM

## 2021-05-03 DIAGNOSIS — E11.9 DIABETES MELLITUS WITHOUT COMPLICATION: ICD-10-CM

## 2021-05-03 DIAGNOSIS — E78.5 HYPERLIPIDEMIA, UNSPECIFIED HYPERLIPIDEMIA TYPE: ICD-10-CM

## 2021-05-03 PROCEDURE — 99999 PR PBB SHADOW E&M-EST. PATIENT-LVL III: CPT | Mod: PBBFAC,,, | Performed by: INTERNAL MEDICINE

## 2021-05-03 PROCEDURE — 99214 PR OFFICE/OUTPT VISIT, EST, LEVL IV, 30-39 MIN: ICD-10-PCS | Mod: 25,S$GLB,, | Performed by: INTERNAL MEDICINE

## 2021-05-03 PROCEDURE — 99999 PR PBB SHADOW E&M-EST. PATIENT-LVL III: ICD-10-PCS | Mod: PBBFAC,,, | Performed by: INTERNAL MEDICINE

## 2021-05-03 PROCEDURE — 1126F PR PAIN SEVERITY QUANTIFIED, NO PAIN PRESENT: ICD-10-PCS | Mod: S$GLB,,, | Performed by: INTERNAL MEDICINE

## 2021-05-03 PROCEDURE — 3008F PR BODY MASS INDEX (BMI) DOCUMENTED: ICD-10-PCS | Mod: CPTII,S$GLB,, | Performed by: INTERNAL MEDICINE

## 2021-05-03 PROCEDURE — 99214 OFFICE O/P EST MOD 30 MIN: CPT | Mod: 25,S$GLB,, | Performed by: INTERNAL MEDICINE

## 2021-05-03 PROCEDURE — 3008F BODY MASS INDEX DOCD: CPT | Mod: CPTII,S$GLB,, | Performed by: INTERNAL MEDICINE

## 2021-05-03 PROCEDURE — 93000 ELECTROCARDIOGRAM COMPLETE: CPT | Mod: S$GLB,,, | Performed by: INTERNAL MEDICINE

## 2021-05-03 PROCEDURE — 93000 EKG 12-LEAD: ICD-10-PCS | Mod: S$GLB,,, | Performed by: INTERNAL MEDICINE

## 2021-05-03 PROCEDURE — 1126F AMNT PAIN NOTED NONE PRSNT: CPT | Mod: S$GLB,,, | Performed by: INTERNAL MEDICINE

## 2021-05-03 RX ORDER — HYDROCHLOROTHIAZIDE 12.5 MG/1
12.5 CAPSULE ORAL EVERY MORNING
COMMUNITY
Start: 2021-03-25

## 2021-05-03 RX ORDER — TADALAFIL 20 MG/1
20 TABLET ORAL NIGHTLY
COMMUNITY
Start: 2021-03-19 | End: 2021-11-08 | Stop reason: SDUPTHER

## 2021-05-03 RX ORDER — ALBUTEROL SULFATE 90 UG/1
2 AEROSOL, METERED RESPIRATORY (INHALATION) EVERY 4 HOURS PRN
COMMUNITY
Start: 2021-03-18

## 2021-05-03 RX ORDER — ACETAMINOPHEN 500 MG
1000 TABLET ORAL
COMMUNITY
Start: 2020-12-31 | End: 2021-12-31

## 2021-05-03 RX ORDER — FLUTICASONE PROPIONATE 50 MCG
SPRAY, SUSPENSION (ML) NASAL
COMMUNITY
Start: 2020-12-26

## 2021-07-01 ENCOUNTER — PATIENT MESSAGE (OUTPATIENT)
Dept: ADMINISTRATIVE | Facility: OTHER | Age: 66
End: 2021-07-01

## 2021-11-03 ENCOUNTER — TELEPHONE (OUTPATIENT)
Dept: CARDIOLOGY | Facility: CLINIC | Age: 66
End: 2021-11-03
Payer: MEDICARE

## 2021-11-08 ENCOUNTER — TELEPHONE (OUTPATIENT)
Dept: CARDIOLOGY | Facility: CLINIC | Age: 66
End: 2021-11-08

## 2021-11-08 ENCOUNTER — OFFICE VISIT (OUTPATIENT)
Dept: CARDIOLOGY | Facility: CLINIC | Age: 66
End: 2021-11-08
Payer: MEDICARE

## 2021-11-08 VITALS
SYSTOLIC BLOOD PRESSURE: 125 MMHG | HEIGHT: 70 IN | BODY MASS INDEX: 43.58 KG/M2 | WEIGHT: 304.44 LBS | OXYGEN SATURATION: 99 % | DIASTOLIC BLOOD PRESSURE: 62 MMHG | HEART RATE: 71 BPM

## 2021-11-08 DIAGNOSIS — I25.2 OLD MI (MYOCARDIAL INFARCTION): ICD-10-CM

## 2021-11-08 DIAGNOSIS — E78.5 HYPERLIPIDEMIA, UNSPECIFIED HYPERLIPIDEMIA TYPE: ICD-10-CM

## 2021-11-08 DIAGNOSIS — R60.0 LOCALIZED EDEMA: Primary | ICD-10-CM

## 2021-11-08 DIAGNOSIS — I82.5Z9 CHRONIC DEEP VEIN THROMBOSIS (DVT) OF DISTAL VEIN OF LOWER EXTREMITY, UNSPECIFIED LATERALITY: ICD-10-CM

## 2021-11-08 DIAGNOSIS — Z98.61 POST PTCA: ICD-10-CM

## 2021-11-08 DIAGNOSIS — I10 ESSENTIAL HYPERTENSION: ICD-10-CM

## 2021-11-08 DIAGNOSIS — I25.10 CORONARY ARTERY DISEASE INVOLVING NATIVE CORONARY ARTERY OF NATIVE HEART WITHOUT ANGINA PECTORIS: ICD-10-CM

## 2021-11-08 DIAGNOSIS — Z98.61 S/P PTCA (PERCUTANEOUS TRANSLUMINAL CORONARY ANGIOPLASTY): ICD-10-CM

## 2021-11-08 DIAGNOSIS — E11.9 DIABETES MELLITUS WITHOUT COMPLICATION: ICD-10-CM

## 2021-11-08 PROCEDURE — 99214 PR OFFICE/OUTPT VISIT, EST, LEVL IV, 30-39 MIN: ICD-10-PCS | Mod: 25,S$GLB,, | Performed by: INTERNAL MEDICINE

## 2021-11-08 PROCEDURE — 1159F PR MEDICATION LIST DOCUMENTED IN MEDICAL RECORD: ICD-10-PCS | Mod: CPTII,S$GLB,, | Performed by: INTERNAL MEDICINE

## 2021-11-08 PROCEDURE — 99214 OFFICE O/P EST MOD 30 MIN: CPT | Mod: 25,S$GLB,, | Performed by: INTERNAL MEDICINE

## 2021-11-08 PROCEDURE — 3078F DIAST BP <80 MM HG: CPT | Mod: CPTII,S$GLB,, | Performed by: INTERNAL MEDICINE

## 2021-11-08 PROCEDURE — 99999 PR PBB SHADOW E&M-EST. PATIENT-LVL III: ICD-10-PCS | Mod: PBBFAC,,, | Performed by: INTERNAL MEDICINE

## 2021-11-08 PROCEDURE — 93000 EKG 12-LEAD: ICD-10-PCS | Mod: S$GLB,,, | Performed by: INTERNAL MEDICINE

## 2021-11-08 PROCEDURE — 3008F PR BODY MASS INDEX (BMI) DOCUMENTED: ICD-10-PCS | Mod: CPTII,S$GLB,, | Performed by: INTERNAL MEDICINE

## 2021-11-08 PROCEDURE — 99999 PR PBB SHADOW E&M-EST. PATIENT-LVL III: CPT | Mod: PBBFAC,,, | Performed by: INTERNAL MEDICINE

## 2021-11-08 PROCEDURE — 3078F PR MOST RECENT DIASTOLIC BLOOD PRESSURE < 80 MM HG: ICD-10-PCS | Mod: CPTII,S$GLB,, | Performed by: INTERNAL MEDICINE

## 2021-11-08 PROCEDURE — 1159F MED LIST DOCD IN RCRD: CPT | Mod: CPTII,S$GLB,, | Performed by: INTERNAL MEDICINE

## 2021-11-08 PROCEDURE — 1160F RVW MEDS BY RX/DR IN RCRD: CPT | Mod: CPTII,S$GLB,, | Performed by: INTERNAL MEDICINE

## 2021-11-08 PROCEDURE — 3074F PR MOST RECENT SYSTOLIC BLOOD PRESSURE < 130 MM HG: ICD-10-PCS | Mod: CPTII,S$GLB,, | Performed by: INTERNAL MEDICINE

## 2021-11-08 PROCEDURE — 93000 ELECTROCARDIOGRAM COMPLETE: CPT | Mod: S$GLB,,, | Performed by: INTERNAL MEDICINE

## 2021-11-08 PROCEDURE — 4010F PR ACE/ARB THEARPY RXD/TAKEN: ICD-10-PCS | Mod: CPTII,S$GLB,, | Performed by: INTERNAL MEDICINE

## 2021-11-08 PROCEDURE — 3074F SYST BP LT 130 MM HG: CPT | Mod: CPTII,S$GLB,, | Performed by: INTERNAL MEDICINE

## 2021-11-08 PROCEDURE — 3008F BODY MASS INDEX DOCD: CPT | Mod: CPTII,S$GLB,, | Performed by: INTERNAL MEDICINE

## 2021-11-08 PROCEDURE — 1160F PR REVIEW ALL MEDS BY PRESCRIBER/CLIN PHARMACIST DOCUMENTED: ICD-10-PCS | Mod: CPTII,S$GLB,, | Performed by: INTERNAL MEDICINE

## 2021-11-08 PROCEDURE — 4010F ACE/ARB THERAPY RXD/TAKEN: CPT | Mod: CPTII,S$GLB,, | Performed by: INTERNAL MEDICINE

## 2021-11-08 RX ORDER — TADALAFIL 20 MG/1
20 TABLET ORAL NIGHTLY
Qty: 10 TABLET | Refills: 11 | Status: SHIPPED | OUTPATIENT
Start: 2021-11-08 | End: 2023-06-14 | Stop reason: SDUPTHER

## 2023-05-24 ENCOUNTER — TELEPHONE (OUTPATIENT)
Dept: CARDIOLOGY | Facility: CLINIC | Age: 68
End: 2023-05-24
Payer: MEDICARE

## 2023-05-24 NOTE — TELEPHONE ENCOUNTER
Spoke with patient.  Appointment rescheduled.        ----- Message from Kavita Kelley sent at 5/24/2023 12:42 PM CDT -----  Pt has an appt today but states that he will not be able to make it at that time . He would like to reschedule appt for a f/u EKG. Can be reached at [phone]

## 2023-06-14 ENCOUNTER — OFFICE VISIT (OUTPATIENT)
Dept: CARDIOLOGY | Facility: CLINIC | Age: 68
End: 2023-06-14
Payer: MEDICARE

## 2023-06-14 VITALS
HEIGHT: 70 IN | BODY MASS INDEX: 45.1 KG/M2 | HEART RATE: 79 BPM | SYSTOLIC BLOOD PRESSURE: 131 MMHG | OXYGEN SATURATION: 97 % | DIASTOLIC BLOOD PRESSURE: 72 MMHG | WEIGHT: 315 LBS

## 2023-06-14 DIAGNOSIS — E78.5 HYPERLIPIDEMIA, UNSPECIFIED HYPERLIPIDEMIA TYPE: ICD-10-CM

## 2023-06-14 DIAGNOSIS — I25.10 CORONARY ARTERY DISEASE INVOLVING NATIVE CORONARY ARTERY OF NATIVE HEART WITHOUT ANGINA PECTORIS: ICD-10-CM

## 2023-06-14 DIAGNOSIS — E11.9 DIABETES MELLITUS WITHOUT COMPLICATION: ICD-10-CM

## 2023-06-14 DIAGNOSIS — Z98.61 S/P PTCA (PERCUTANEOUS TRANSLUMINAL CORONARY ANGIOPLASTY): ICD-10-CM

## 2023-06-14 DIAGNOSIS — I25.2 OLD MI (MYOCARDIAL INFARCTION): ICD-10-CM

## 2023-06-14 DIAGNOSIS — I10 ESSENTIAL HYPERTENSION: Primary | ICD-10-CM

## 2023-06-14 DIAGNOSIS — I82.5Z9 CHRONIC DEEP VEIN THROMBOSIS (DVT) OF DISTAL VEIN OF LOWER EXTREMITY, UNSPECIFIED LATERALITY: ICD-10-CM

## 2023-06-14 DIAGNOSIS — E66.01 MORBID OBESITY: ICD-10-CM

## 2023-06-14 PROCEDURE — 3288F FALL RISK ASSESSMENT DOCD: CPT | Mod: CPTII,S$GLB,, | Performed by: INTERNAL MEDICINE

## 2023-06-14 PROCEDURE — 1159F MED LIST DOCD IN RCRD: CPT | Mod: CPTII,S$GLB,, | Performed by: INTERNAL MEDICINE

## 2023-06-14 PROCEDURE — 3078F DIAST BP <80 MM HG: CPT | Mod: CPTII,S$GLB,, | Performed by: INTERNAL MEDICINE

## 2023-06-14 PROCEDURE — 1126F PR PAIN SEVERITY QUANTIFIED, NO PAIN PRESENT: ICD-10-PCS | Mod: CPTII,S$GLB,, | Performed by: INTERNAL MEDICINE

## 2023-06-14 PROCEDURE — 93000 ELECTROCARDIOGRAM COMPLETE: CPT | Mod: S$GLB,,, | Performed by: INTERNAL MEDICINE

## 2023-06-14 PROCEDURE — 1126F AMNT PAIN NOTED NONE PRSNT: CPT | Mod: CPTII,S$GLB,, | Performed by: INTERNAL MEDICINE

## 2023-06-14 PROCEDURE — 3008F BODY MASS INDEX DOCD: CPT | Mod: CPTII,S$GLB,, | Performed by: INTERNAL MEDICINE

## 2023-06-14 PROCEDURE — 3288F PR FALLS RISK ASSESSMENT DOCUMENTED: ICD-10-PCS | Mod: CPTII,S$GLB,, | Performed by: INTERNAL MEDICINE

## 2023-06-14 PROCEDURE — 1160F RVW MEDS BY RX/DR IN RCRD: CPT | Mod: CPTII,S$GLB,, | Performed by: INTERNAL MEDICINE

## 2023-06-14 PROCEDURE — 99999 PR PBB SHADOW E&M-EST. PATIENT-LVL III: CPT | Mod: PBBFAC,,, | Performed by: INTERNAL MEDICINE

## 2023-06-14 PROCEDURE — 1159F PR MEDICATION LIST DOCUMENTED IN MEDICAL RECORD: ICD-10-PCS | Mod: CPTII,S$GLB,, | Performed by: INTERNAL MEDICINE

## 2023-06-14 PROCEDURE — 3008F PR BODY MASS INDEX (BMI) DOCUMENTED: ICD-10-PCS | Mod: CPTII,S$GLB,, | Performed by: INTERNAL MEDICINE

## 2023-06-14 PROCEDURE — 4010F ACE/ARB THERAPY RXD/TAKEN: CPT | Mod: CPTII,S$GLB,, | Performed by: INTERNAL MEDICINE

## 2023-06-14 PROCEDURE — 3075F SYST BP GE 130 - 139MM HG: CPT | Mod: CPTII,S$GLB,, | Performed by: INTERNAL MEDICINE

## 2023-06-14 PROCEDURE — 1101F PR PT FALLS ASSESS DOC 0-1 FALLS W/OUT INJ PAST YR: ICD-10-PCS | Mod: CPTII,S$GLB,, | Performed by: INTERNAL MEDICINE

## 2023-06-14 PROCEDURE — 3078F PR MOST RECENT DIASTOLIC BLOOD PRESSURE < 80 MM HG: ICD-10-PCS | Mod: CPTII,S$GLB,, | Performed by: INTERNAL MEDICINE

## 2023-06-14 PROCEDURE — 99999 PR PBB SHADOW E&M-EST. PATIENT-LVL III: ICD-10-PCS | Mod: PBBFAC,,, | Performed by: INTERNAL MEDICINE

## 2023-06-14 PROCEDURE — 99213 OFFICE O/P EST LOW 20 MIN: CPT | Mod: 25,S$GLB,, | Performed by: INTERNAL MEDICINE

## 2023-06-14 PROCEDURE — 99213 PR OFFICE/OUTPT VISIT, EST, LEVL III, 20-29 MIN: ICD-10-PCS | Mod: 25,S$GLB,, | Performed by: INTERNAL MEDICINE

## 2023-06-14 PROCEDURE — 93000 EKG 12-LEAD: ICD-10-PCS | Mod: S$GLB,,, | Performed by: INTERNAL MEDICINE

## 2023-06-14 PROCEDURE — 1101F PT FALLS ASSESS-DOCD LE1/YR: CPT | Mod: CPTII,S$GLB,, | Performed by: INTERNAL MEDICINE

## 2023-06-14 PROCEDURE — 1160F PR REVIEW ALL MEDS BY PRESCRIBER/CLIN PHARMACIST DOCUMENTED: ICD-10-PCS | Mod: CPTII,S$GLB,, | Performed by: INTERNAL MEDICINE

## 2023-06-14 PROCEDURE — 3075F PR MOST RECENT SYSTOLIC BLOOD PRESS GE 130-139MM HG: ICD-10-PCS | Mod: CPTII,S$GLB,, | Performed by: INTERNAL MEDICINE

## 2023-06-14 PROCEDURE — 4010F PR ACE/ARB THEARPY RXD/TAKEN: ICD-10-PCS | Mod: CPTII,S$GLB,, | Performed by: INTERNAL MEDICINE

## 2023-06-14 RX ORDER — TADALAFIL 20 MG/1
20 TABLET ORAL NIGHTLY
Qty: 10 TABLET | Refills: 11 | Status: SHIPPED | OUTPATIENT
Start: 2023-06-14

## 2023-06-14 NOTE — PROGRESS NOTES
Subjective:      Patient ID: Charles A Toussaint is a 68 y.o. male.    Chief Complaint: Follow-up    HPI:  Feels OK    [place] [facility]    Review of Systems   Cardiovascular:  Negative for chest pain, claudication, dyspnea on exertion, irregular heartbeat, leg swelling, near-syncope, orthopnea, palpitations and syncope.      Pt has chronic pain in knees      Past Medical History:   Diagnosis Date    Coronary artery disease     Hyperlipidemia     Hypertension     MI (myocardial infarction)             Past Surgical History:   Procedure Laterality Date    CARDIAC CATHETERIZATION      CORONARY ANGIOPLASTY       at [place],   at [facility] in [place]    HERNIA REPAIR Right     1990  inguinal       Family History   Problem Relation Age of Onset    Breast cancer Mother     Hypertension Sister     Heart disease Brother     Coronary artery disease Brother     Hypertension Brother        Social History     Socioeconomic History    Marital status:    Tobacco Use    Smoking status: Former     Packs/day: 0.25     Types: Cigarettes     Quit date: 10/10/2016     Years since quittin.6    Smokeless tobacco: Never   Substance and Sexual Activity    Alcohol use: Yes     Alcohol/week: 0.0 standard drinks       Current Outpatient Medications on File Prior to Visit   Medication Sig Dispense Refill    albuterol (PROVENTIL/VENTOLIN HFA) 90 mcg/actuation inhaler Inhale 2 puffs into the lungs every 4 (four) hours as needed.      amlodipine (NORVASC) 5 MG tablet TK 1 T PO QD  2    aspirin (ECOTRIN) 81 MG EC tablet Take 81 mg by mouth once daily.      atorvastatin (LIPITOR) 40 MG tablet Take 40 mg by mouth once daily.      fluticasone propionate (FLONASE) 50 mcg/actuation nasal spray       gabapentin (NEURONTIN) 300 MG capsule 300 mg daily as needed.  2    hydroCHLOROthiazide (MICROZIDE) 12.5 mg capsule Take 12.5 mg by mouth every morning.      ibuprofen (ADVIL,MOTRIN) 800 MG tablet as needed.     "   latanoprost 0.005 % ophthalmic solution Apply 2 drops to eye.      losartan (COZAAR) 50 MG tablet Take 1 tablet (50 mg total) by mouth once daily. 90 tablet 3    metFORMIN (GLUCOPHAGE-XR) 500 MG 24 hr tablet Take 500 mg by mouth once daily.  1    pantoprazole (PROTONIX) 20 MG tablet Take 20 mg by mouth once daily.  0    ZETIA 10 mg tablet TAKE 1 TABLET(10 MG) BY MOUTH EVERY DAY 90 tablet 3    [DISCONTINUED] tadalafiL (CIALIS) 20 MG Tab Take 1 tablet (20 mg total) by mouth nightly. 10 tablet 11    [DISCONTINUED] carvedilol (COREG) 6.25 MG tablet Take 1 tablet (6.25 mg total) by mouth 2 (two) times daily. (Patient not taking: Reported on 6/14/2023) 180 tablet 3     No current facility-administered medications on file prior to visit.       Review of patient's allergies indicates:  No Known Allergies  Objective:     Vitals:    06/14/23 1502   BP: 131/72   BP Location: Left arm   Patient Position: Sitting   BP Method: Large (Automatic)   Pulse: 79   SpO2: 97%   Weight: (!) 143.1 kg (315 lb 5.9 oz)   Height: 5' 10" (1.778 m)        Physical Exam  Constitutional:       General: He is not in acute distress.     Appearance: He is well-developed. He is obese. He is not diaphoretic.   Eyes:      General: No scleral icterus.  Neck:      Vascular: No carotid bruit or JVD.   Cardiovascular:      Rate and Rhythm: Regular rhythm.      Heart sounds: Normal heart sounds. No murmur heard.    No friction rub. No gallop.   Pulmonary:      Effort: Pulmonary effort is normal. No respiratory distress.      Breath sounds: Normal breath sounds.   Musculoskeletal:      Right lower leg: Edema present.      Left lower leg: Edema (trivial bilateral) present.   Skin:     General: Skin is warm and dry.   Neurological:      Mental Status: He is alert and oriented to person, place, and time.   Psychiatric:         Behavior: Behavior normal.         Thought Content: Thought content normal.         Judgment: Judgment normal.          ECG today: " NSR, WNL    No visits with results within 6 Month(s) from this visit.   Latest known visit with results is:   Hospital Outpatient Visit on 03/13/2020   Component Date Value Ref Range Status    Target HR 03/13/2020 134  bpm Final    HR at rest 03/13/2020 55.0  bpm Final    Systolic blood pressure 03/13/2020 111.0  mmHg Final    Diastolic blood pressure 03/13/2020 69.0  mmHg Final    RPP 03/13/2020 6,105   Final    85% Max Predicted HR 03/13/2020 134   Final    Max Predicted HR 03/13/2020 158   Final    [facility] CV CPX PATIENT IS MALE 03/13/2020 1   Final    [facility] CV CPX PATIENT IS FEMALE 03/13/2020 0   Final    dose 03/13/2020 0.4  mg Final    Peak HR 03/13/2020 80.0  bpm Final    Peak Systolic BP 03/13/2020 120.0  mmHg Final    Peak Diatolic BP 03/13/2020 73.0  mmHg Final    Peak RPP 03/13/2020 9,600   Final    % Max HR Achieved 03/13/2020 51   Final   (  Encounter Form Printed    Encounter form printed on 03/06/20 07:51 AM         NM Myocardial Perfusion Spect Multi Pharmacologic  Status: Final result     [facility] Results Release    [facility] Status: Active  Results Release     PACS Images for [facility] Viewer     Show images for NM Myocardial Perfusion Spect Multi Pharmacologic  All Reviewers List    Neymar Montgomery MD on 3/13/2020 10:05     NM Myocardial Perfusion Spect Multi Pharmacologic  Order: [id number]  Status: Final result      Visible to patient: Yes (not seen)       Next appt: None      Dx: Post PTCA       0 Result Notes      Details    Reading Physician Reading Date Result Priority   Sterling Rosales MD  [phone] 3/13/2020 Routine   Nael Bar MD  [phone] 3/13/2020      Narrative & Impression  EXAMINATION:  NM MYOCARDIAL PERFUSION SPECT MULTI PHARM     CLINICAL HISTORY:  Prior revascularization (either PTCA or CABG);  Coronary angioplasty status     TECHNIQUE:  SPECT images in short, vertical and horizontal long axis were acquired after the injection of 10.2 mCi of Tc-99m  tetrofosmin at rest and 32.4 mCi during a cardiac stress. The clinical stress and ECG portion of the study is to be read separately.     COMPARISON:  None.     FINDINGS:  The quality of the study is decent noting some mild adjacent GI activity.     Stress SPECT images demonstrate homogenous distribution of the tracer throughout the left ventricle. On the resting images, there is matched homogenous distribution of the tracer throughout the left ventricle.     The gated post-stress images reveal normal wall motion and normal wall thickening with an estimated LVEF of 59 %. The LV cavity is not dilated with an end-diastolic volume of 97 ml and an end-systolic volume of 39 ml.     Impression:     1.  Scintigraphically negative for ischemia or infarct.  2. the global left ventricular systolic function is normal with an LV ejection fraction of 59 % and no evidence of LV dilatation. Wall motion is normal.        Electronically signed by: Nael Bar MD  Date:                                            03/13/2020  Time:                                           09:53               Exam Ended: 03/13/20 08:49 Last Resulted: 03/13/20 09:53            Order: [id number]   Ref Range & Units 3 wk ago   Sodium 135 - 146 mmol/L 140    Potassium 3.6 - 5.2 mmol/L 3.9    Chloride 96 - 110 mmol/L 106    Carbon Dioxide 24 - 32 mmol/L 26    Glucose 65 - 99 mg/dL 103 High     Calcium 8.4 - 10.3 mg/dL 9.9    BUN 7.0 - 25.0 mg/dL 14.0    Creatinine 0.70 - 1.40 mg/dL 1.14    Total Protein 6.0 - 8.0 g/dL 7.8    Albumin 3.4 - 5.0 g/dL 4.2    AST <45 U/L 21    ALT <46 U/L 24    Alkaline Phosphatase 20 - 120 U/L 29    Bilirubin, Total <1.3 mg/dL 0.6    EGFR >=90 mL/min 71 Low     Comment: Calculation based on the Chronic Kidney Disease Epidemiology Collaboration (CKD-EPI) equation refit without adjustment for race.   Resulting Agency      [facility] (Acc# [id number]) (Order [id number])   MyChart Results Release    MyChart Status:  Active  Results Release       [facility] Information       Contains abnormal data HEMOGLOBIN A1C  Order: [id number]  Status: Final result    Next appt: None           Component Ref Range & Units 3 wk ago 10 mo ago 1 yr ago   Hemoglobin A1C 4.7 - 5.6 % 7.4 High   6.8 High   7.3 High     Estimated Average Glucose <115 mg/dL 166 High   148 High   163 High     Resulting Agency  [facility]           Narrative  Performed by: [facility]  Hemoglobin A1c Interpretative Guide   Hemoglobin A1c Result  Interpretation   4.7%-5.6% Normal Reference Range   5.7%-6.4% Increased Risk for Diabetes   >6.4%      Diagnostic of Diabetes   <7.0%      Adult Glycemic Control Target      Specimen Collected: 05/18/23 12:47 Last Resulted: 05/18/23 13:45   Received From: [facility] Results Release    MyChart Status: Active  Results Release       [facility]  Outside Information       suggestion  Information displayed in this report may not trend or trigger automated decision support.      Contains abnormal data CBC with Differential  Order: [id number]   Ref Range & Units 3 wk ago   WBC 4.5 - 11.0 103/uL 6.4    RBC 4.50 - 5.90 106/uL 4.63    Hemoglobin 13.5 - 17.5 gm/dL 13.4 Low     Hematocrit 40.0 - 51.0 % 39.2 Low     MCV 80.0 - 100.0 fL 84.6    MCH 26.0 - 34.0 pg 29.0    MCHC 31.0 - 37.0 g/dL 34.3    RDW 11.5 - 14.5 % 14.8 High     Platelet Count 130 - 400 103/uL 208    MPV 7.4 - 10.4 fL 7.8    Resulting Agency  [facility]   Narrative  Performed by [facility]  Result Reviewed  Specimen Collected: 05/18/23 12:47 Last     Assessment:     1. Essential hypertension    2. Hyperlipidemia, unspecified hyperlipidemia type    3. Coronary artery disease involving native coronary artery of native heart without angina pectoris    4. Old MI (myocardial infarction)    5. S/P PTCA (percutaneous transluminal coronary angioplasty)    6. Chronic deep vein thrombosis (DVT) of distal vein of lower  extremity, unspecified laterality    7. Diabetes mellitus without complication    8. Morbid obesity      Plan:   Brian was seen today for follow-up.    Diagnoses and all orders for this visit:    Essential hypertension  -     IN OFFICE EKG 12-LEAD (to [place])  -     CBC Auto Differential; Future  -     Comprehensive Metabolic Panel; Future  -     Lipid Panel; Future  -     TSH; Future    Hyperlipidemia, unspecified hyperlipidemia type  -     IN OFFICE EKG 12-LEAD (to [place])  -     CBC Auto Differential; Future  -     Comprehensive Metabolic Panel; Future  -     Lipid Panel; Future  -     TSH; Future    Coronary artery disease involving native coronary artery of native heart without angina pectoris  -     IN OFFICE EKG 12-LEAD (to [place])  -     CBC Auto Differential; Future  -     Comprehensive Metabolic Panel; Future  -     Lipid Panel; Future  -     TSH; Future    Old MI (myocardial infarction)  -     IN OFFICE EKG 12-LEAD (to [place])  -     CBC Auto Differential; Future  -     Comprehensive Metabolic Panel; Future  -     Lipid Panel; Future  -     TSH; Future    S/P PTCA (percutaneous transluminal coronary angioplasty)  -     IN OFFICE EKG 12-LEAD (to [place])  -     CBC Auto Differential; Future  -     Comprehensive Metabolic Panel; Future  -     Lipid Panel; Future  -     TSH; Future    Chronic deep vein thrombosis (DVT) of distal vein of lower extremity, unspecified laterality  -     IN OFFICE EKG 12-LEAD (to [place])  -     CBC Auto Differential; Future  -     Comprehensive Metabolic Panel; Future  -     Lipid Panel; Future  -     TSH; Future    Diabetes mellitus without complication  -     IN OFFICE EKG 12-LEAD (to [place])  -     CBC Auto Differential; Future  -     Comprehensive Metabolic Panel; Future  -     Lipid Panel; Future  -     TSH; Future    Morbid obesity  -     CBC Auto Differential; Future  -     Comprehensive Metabolic Panel; Future  -     Lipid Panel; Future  -     TSH; Future    Other orders  -      tadalafiL (CIALIS) 20 MG Tab; Take 1 tablet (20 mg total) by mouth nightly.     Pt is doing well    Trace venous stasis edema due to BMI 45, amlodipine, gabapentin, ibuprofen    No sugar, low carb diet discussed    Same meds    F/u with PCP.  Consider Ozempic    F/u with Dr Aguilar (oncologist ) for h/o colon cancer (now in remission)    No follow-ups on file.

## 2024-01-18 ENCOUNTER — OFFICE VISIT (OUTPATIENT)
Dept: CARDIOLOGY | Facility: CLINIC | Age: 69
End: 2024-01-18
Payer: MEDICARE

## 2024-01-18 VITALS
HEIGHT: 70 IN | OXYGEN SATURATION: 98 % | BODY MASS INDEX: 42.81 KG/M2 | WEIGHT: 299.06 LBS | HEART RATE: 68 BPM | DIASTOLIC BLOOD PRESSURE: 68 MMHG | SYSTOLIC BLOOD PRESSURE: 128 MMHG

## 2024-01-18 DIAGNOSIS — I25.10 CORONARY ARTERY DISEASE INVOLVING NATIVE CORONARY ARTERY OF NATIVE HEART WITHOUT ANGINA PECTORIS: Primary | ICD-10-CM

## 2024-01-18 DIAGNOSIS — I10 ESSENTIAL HYPERTENSION: ICD-10-CM

## 2024-01-18 DIAGNOSIS — I82.5Z9 CHRONIC DEEP VEIN THROMBOSIS (DVT) OF DISTAL VEIN OF LOWER EXTREMITY, UNSPECIFIED LATERALITY: ICD-10-CM

## 2024-01-18 DIAGNOSIS — E78.5 HYPERLIPIDEMIA, UNSPECIFIED HYPERLIPIDEMIA TYPE: ICD-10-CM

## 2024-01-18 PROCEDURE — 99999 PR PBB SHADOW E&M-EST. PATIENT-LVL IV: CPT | Mod: PBBFAC,,, | Performed by: INTERNAL MEDICINE

## 2024-01-18 PROCEDURE — 99204 OFFICE O/P NEW MOD 45 MIN: CPT | Mod: S$GLB,,, | Performed by: INTERNAL MEDICINE

## 2024-01-18 RX ORDER — METOPROLOL SUCCINATE 25 MG/1
25 TABLET, EXTENDED RELEASE ORAL DAILY
Qty: 90 TABLET | Refills: 3 | Status: SHIPPED | OUTPATIENT
Start: 2024-01-18 | End: 2025-01-17

## 2024-01-18 NOTE — PROGRESS NOTES
[facility] - Cardiology [address]  Cardiology Clinic Note      Chief Complaint  Chief Complaint   Patient presents with    Follow-up       HPI:      68-year-old male with past medical history colon cancer (CapOx), hypertension, hyperlipidemia, diabetes, DVT peroneal, coronary artery disease status post PCI 1999 and 2006 last treated ISR of LAD 2019 MPI 2020 no defect normal estimated EF,     Lipids at goal but not on beta-blocker   asymptomatic     EKG 06/2023 normal sinus rhythm consider LVH    Medications  Current Outpatient Medications   Medication Sig Dispense Refill    albuterol (PROVENTIL/VENTOLIN HFA) 90 mcg/actuation inhaler Inhale 2 puffs into the lungs every 4 (four) hours as needed.      aspirin (ECOTRIN) 81 MG EC tablet Take 81 mg by mouth once daily.      atorvastatin (LIPITOR) 40 MG tablet Take 40 mg by mouth once daily.      gabapentin (NEURONTIN) 300 MG capsule 300 mg daily as needed.  2    hydroCHLOROthiazide (MICROZIDE) 12.5 mg capsule Take 12.5 mg by mouth every morning.      losartan (COZAAR) 50 MG tablet Take 1 tablet (50 mg total) by mouth once daily. 90 tablet 3    metFORMIN (GLUCOPHAGE-XR) 500 MG 24 hr tablet Take 500 mg by mouth once daily.  1    pantoprazole (PROTONIX) 20 MG tablet Take 20 mg by mouth once daily.  0    ZETIA 10 mg tablet TAKE 1 TABLET(10 MG) BY MOUTH EVERY DAY 90 tablet 3    fluticasone propionate (FLONASE) 50 mcg/actuation nasal spray       ibuprofen (ADVIL,MOTRIN) 800 MG tablet as needed.       latanoprost 0.005 % ophthalmic solution Apply 2 drops to eye.      metoprolol succinate (TOPROL-XL) 25 MG 24 hr tablet Take 1 tablet (25 mg total) by mouth once daily. 90 tablet 3    tadalafiL (CIALIS) 20 MG Tab Take 1 tablet (20 mg total) by mouth nightly. 10 tablet 11     No current facility-administered medications for this visit.        History  Past Medical History:   Diagnosis Date    Coronary artery disease     Hyperlipidemia     Hypertension     MI (myocardial infarction)           Past Surgical History:   Procedure Laterality Date    CARDIAC CATHETERIZATION      CORONARY ANGIOPLASTY       at [place],  2006 at [facility] in [place]    HERNIA REPAIR Right     1990  inguinal     Social History     Socioeconomic History    Marital status:    Tobacco Use    Smoking status: Former     Current packs/day: 0.00     Types: Cigarettes     Quit date: 10/10/2016     Years since quittin.2    Smokeless tobacco: Never   Substance and Sexual Activity    Alcohol use: Yes     Alcohol/week: 0.0 standard drinks of alcohol     Family History   Problem Relation Age of Onset    Breast cancer Mother     Hypertension Sister     Heart disease Brother     Coronary artery disease Brother     Hypertension Brother         Allergies  Review of patient's allergies indicates:  No Known Allergies    Review of Systems   Review of Systems   Constitutional: Negative for fever.   HENT:  Negative for nosebleeds.    Eyes:  Negative for visual disturbance.   Cardiovascular:  Negative for chest pain, claudication, dyspnea on exertion, palpitations and syncope.   Respiratory:  Negative for cough, hemoptysis and wheezing.    Endocrine: Negative for cold intolerance, heat intolerance, polyphagia and polyuria.   Hematologic/Lymphatic: Negative for bleeding problem.   Skin:  Negative for rash.   Musculoskeletal:  Negative for myalgias.   Gastrointestinal:  Negative for hematemesis, hematochezia, nausea and vomiting.   Genitourinary:  Negative for dysuria.   Neurological:  Negative for focal weakness and sensory change.   Psychiatric/Behavioral:  Negative for altered mental status.        Physical Exam  Vitals:    24 1428   BP: 128/68   Pulse: 68     Wt Readings from Last 1 Encounters:   24 135.6 kg (299 lb 0.9 oz)     Physical Exam  HENT:      Head: Normocephalic and atraumatic.      Mouth/Throat:      Mouth: Mucous membranes are moist.   Eyes:      Extraocular Movements: Extraocular movements  intact.      Pupils: Pupils are equal, round, and reactive to light.   Neck:      Vascular: No carotid bruit or JVD.   Cardiovascular:      Rate and Rhythm: Normal rate and regular rhythm.      Pulses: Normal pulses and intact distal pulses.      Heart sounds: Normal heart sounds. No murmur heard.     No friction rub. No gallop.   Pulmonary:      Effort: Pulmonary effort is normal.      Breath sounds: Normal breath sounds.   Abdominal:      Tenderness: There is no abdominal tenderness. There is no guarding or rebound.   Musculoskeletal:      Right lower leg: No edema.      Left lower leg: No edema.   Skin:     General: Skin is warm and dry.      Capillary Refill: Capillary refill takes less than 2 seconds.   Neurological:      General: No focal deficit present.      Mental Status: He is alert and oriented to person, place, and time.   Psychiatric:         Mood and Affect: Mood normal.         Labs  No visits with results within 6 Month(s) from this visit.   Latest known visit with results is:   Hospital Outpatient Visit on 03/13/2020   Component Date Value Ref Range Status    Target HR 03/13/2020 134  bpm Final    HR at rest 03/13/2020 55.0  bpm Final    Systolic blood pressure 03/13/2020 111.0  mmHg Final    Diastolic blood pressure 03/13/2020 69.0  mmHg Final    RPP 03/13/2020 6,105   Final    85% Max Predicted HR 03/13/2020 134   Final    Max Predicted HR 03/13/2020 158   Final    [facility] CV CPX PATIENT IS MALE 03/13/2020 1   Final    [facility] CV CPX PATIENT IS FEMALE 03/13/2020 0   Final    dose 03/13/2020 0.4  mg Final    Peak HR 03/13/2020 80.0  bpm Final    Peak Systolic BP 03/13/2020 120.0  mmHg Final    Peak Diatolic BP 03/13/2020 73.0  mmHg Final    Peak RPP 03/13/2020 9,600   Final    % Max HR Achieved 03/13/2020 51   Final       EKG   See above    Echo   No results found for this or any previous visit.    Imaging  No results found.    Prior coronary angiogram / intervention:   See above    Assessment and Plan  1.  Coronary artery disease involving native coronary artery of native heart without angina pectoris   Continue aspirin with high-dose statin Zetia add beta-blocker  - metoprolol succinate (TOPROL-XL) 25 MG 24 hr tablet; Take 1 tablet (25 mg total) by mouth once daily.  Dispense: 90 tablet; Refill: 3    2. Essential hypertension   Continue hydrochlorothiazide, losartan   Discontinue amlodipine and start metoprolol    3. Hyperlipidemia, unspecified hyperlipidemia type   Continue statin Zetia    4. Chronic deep vein thrombosis (DVT) of distal vein of lower extremity, unspecified laterality   Appears to have been below-the-knee and he received anticoagulation for 3 months   Consider repeating ultrasound        Follow Up  Follow up in about 1 month (around 2/18/2024).      Simeon Newton MD, FACC, [facility]  Interventional Cardiology     Total professional time spent for the encounter: 45 minutes  Time was spent preparing to see the patient, reviewing results of prior testing, obtaining and/or reviewing separately obtained history, performing a medically appropriate examination and interview, counseling and educating the patient/family, ordering medications/tests/procedures, referring and communicating with other health care professionals, documenting clinical information in the electronic health record, and independently interpreting results.

## 2024-03-18 ENCOUNTER — OFFICE VISIT (OUTPATIENT)
Dept: CARDIOLOGY | Facility: CLINIC | Age: 69
End: 2024-03-18
Payer: MEDICARE

## 2024-03-18 VITALS
DIASTOLIC BLOOD PRESSURE: 67 MMHG | HEIGHT: 70 IN | HEART RATE: 68 BPM | OXYGEN SATURATION: 97 % | WEIGHT: 294.56 LBS | SYSTOLIC BLOOD PRESSURE: 135 MMHG | BODY MASS INDEX: 42.17 KG/M2

## 2024-03-18 DIAGNOSIS — Z98.61 S/P PTCA (PERCUTANEOUS TRANSLUMINAL CORONARY ANGIOPLASTY): ICD-10-CM

## 2024-03-18 DIAGNOSIS — I10 ESSENTIAL HYPERTENSION: ICD-10-CM

## 2024-03-18 DIAGNOSIS — E66.01 MORBID OBESITY: ICD-10-CM

## 2024-03-18 DIAGNOSIS — E78.5 HYPERLIPIDEMIA, UNSPECIFIED HYPERLIPIDEMIA TYPE: ICD-10-CM

## 2024-03-18 DIAGNOSIS — I25.2 OLD MI (MYOCARDIAL INFARCTION): ICD-10-CM

## 2024-03-18 DIAGNOSIS — I25.10 CORONARY ARTERY DISEASE INVOLVING NATIVE CORONARY ARTERY OF NATIVE HEART WITHOUT ANGINA PECTORIS: ICD-10-CM

## 2024-03-18 DIAGNOSIS — I82.5Z9 CHRONIC DEEP VEIN THROMBOSIS (DVT) OF DISTAL VEIN OF LOWER EXTREMITY, UNSPECIFIED LATERALITY: Primary | ICD-10-CM

## 2024-03-18 PROCEDURE — 99999 PR PBB SHADOW E&M-EST. PATIENT-LVL III: CPT | Mod: PBBFAC,,, | Performed by: INTERNAL MEDICINE

## 2024-03-18 PROCEDURE — 99214 OFFICE O/P EST MOD 30 MIN: CPT | Mod: S$GLB,,, | Performed by: INTERNAL MEDICINE

## 2024-03-18 RX ORDER — TIRZEPATIDE 2.5 MG/.5ML
2.5 INJECTION, SOLUTION SUBCUTANEOUS
COMMUNITY
Start: 2023-12-01

## 2024-03-18 RX ORDER — AMOXICILLIN AND CLAVULANATE POTASSIUM 875; 125 MG/1; MG/1
1 TABLET, FILM COATED ORAL 2 TIMES DAILY
COMMUNITY
Start: 2024-03-18 | End: 2024-03-28

## 2024-03-18 NOTE — PROGRESS NOTES
[facility] - Cardiology [address]  Cardiology Clinic Note      Chief Complaint  Chief Complaint   Patient presents with    Follow-up       HPI:      68-year-old male with past medical history colon cancer (CapOx), borderline CKD 3?, hypertension, hyperlipidemia, diabetes, DVT peroneal, coronary artery disease status post PCI 1999 and 2006 last treated ISR of LAD 2019 MPI 2020 no defect normal estimated EF,     Lipids at goal   Last visit added beta-blocker and discontinued amlodipine   asymptomatic     EKG 06/2023 normal sinus rhythm consider LVH    Medications  Current Outpatient Medications   Medication Sig Dispense Refill    albuterol (PROVENTIL/VENTOLIN HFA) 90 mcg/actuation inhaler Inhale 2 puffs into the lungs every 4 (four) hours as needed.      amoxicillin-clavulanate 875-125mg (AUGMENTIN) 875-125 mg per tablet Take 1 tablet by mouth 2 (two) times daily.      aspirin (ECOTRIN) 81 MG EC tablet Take 81 mg by mouth once daily.      atorvastatin (LIPITOR) 40 MG tablet Take 40 mg by mouth once daily.      gabapentin (NEURONTIN) 300 MG capsule 300 mg daily as needed.  2    latanoprost 0.005 % ophthalmic solution Apply 2 drops to eye.      losartan (COZAAR) 50 MG tablet Take 1 tablet (50 mg total) by mouth once daily. 90 tablet 3    metFORMIN (GLUCOPHAGE-XR) 500 MG 24 hr tablet Take 500 mg by mouth once daily.  1    metoprolol succinate (TOPROL-XL) 25 MG 24 hr tablet Take 1 tablet (25 mg total) by mouth once daily. 90 tablet 3    MOUNJARO 2.5 mg/0.5 mL PnIj Inject 2.5 mg into the skin every 7 days.      pantoprazole (PROTONIX) 20 MG tablet Take 20 mg by mouth once daily.  0    tadalafiL (CIALIS) 20 MG Tab Take 1 tablet (20 mg total) by mouth nightly. 10 tablet 11    ZETIA 10 mg tablet TAKE 1 TABLET(10 MG) BY MOUTH EVERY DAY 90 tablet 3    fluticasone propionate (FLONASE) 50 mcg/actuation nasal spray       hydroCHLOROthiazide (MICROZIDE) 12.5 mg capsule Take 12.5 mg by mouth every morning.      ibuprofen  (ADVIL,MOTRIN) 800 MG tablet as needed.        No current facility-administered medications for this visit.        History  Past Medical History:   Diagnosis Date    Coronary artery disease     Hyperlipidemia     Hypertension     MI (myocardial infarction)          Past Surgical History:   Procedure Laterality Date    CARDIAC CATHETERIZATION      CORONARY ANGIOPLASTY       at [place],   at [facility] in [place]    HERNIA REPAIR Right     1990  inguinal     Social History     Socioeconomic History    Marital status:    Tobacco Use    Smoking status: Former     Current packs/day: 0.00     Types: Cigarettes     Quit date: 10/10/2016     Years since quittin.4    Smokeless tobacco: Never   Substance and Sexual Activity    Alcohol use: Yes     Alcohol/week: 0.0 standard drinks of alcohol     Family History   Problem Relation Age of Onset    Breast cancer Mother     Hypertension Sister     Heart disease Brother     Coronary artery disease Brother     Hypertension Brother         Allergies  Review of patient's allergies indicates:  No Known Allergies    Review of Systems   Review of Systems   Constitutional: Negative for fever.   HENT:  Negative for nosebleeds.    Eyes:  Negative for visual disturbance.   Cardiovascular:  Negative for chest pain, claudication, dyspnea on exertion, palpitations and syncope.   Respiratory:  Negative for cough, hemoptysis and wheezing.    Endocrine: Negative for cold intolerance, heat intolerance, polyphagia and polyuria.   Hematologic/Lymphatic: Negative for bleeding problem.   Skin:  Negative for rash.   Musculoskeletal:  Negative for myalgias.   Gastrointestinal:  Negative for hematemesis, hematochezia, nausea and vomiting.   Genitourinary:  Negative for dysuria.   Neurological:  Negative for focal weakness and sensory change.   Psychiatric/Behavioral:  Negative for altered mental status.        Physical Exam  Vitals:    24 1533   BP: 135/67   Pulse: 68     Wt  Readings from Last 1 Encounters:   03/18/24 133.6 kg (294 lb 8.6 oz)     Physical Exam  HENT:      Head: Normocephalic and atraumatic.      Mouth/Throat:      Mouth: Mucous membranes are moist.   Eyes:      Extraocular Movements: Extraocular movements intact.      Pupils: Pupils are equal, round, and reactive to light.   Neck:      Vascular: No carotid bruit or JVD.   Cardiovascular:      Rate and Rhythm: Normal rate and regular rhythm.      Pulses: Normal pulses and intact distal pulses.      Heart sounds: Normal heart sounds. No murmur heard.     No friction rub. No gallop.   Pulmonary:      Effort: Pulmonary effort is normal.      Breath sounds: Normal breath sounds.   Abdominal:      Tenderness: There is no abdominal tenderness. There is no guarding or rebound.   Musculoskeletal:      Right lower leg: No edema.      Left lower leg: No edema.   Skin:     General: Skin is warm and dry.      Capillary Refill: Capillary refill takes less than 2 seconds.   Neurological:      General: No focal deficit present.      Mental Status: He is alert and oriented to person, place, and time.   Psychiatric:         Mood and Affect: Mood normal.         Labs  No visits with results within 6 Month(s) from this visit.   Latest known visit with results is:   Hospital Outpatient Visit on 03/13/2020   Component Date Value Ref Range Status    Target HR 03/13/2020 134  bpm Final    HR at rest 03/13/2020 55.0  bpm Final    Systolic blood pressure 03/13/2020 111.0  mmHg Final    Diastolic blood pressure 03/13/2020 69.0  mmHg Final    RPP 03/13/2020 6,105   Final    85% Max Predicted HR 03/13/2020 134   Final    Max Predicted HR 03/13/2020 158   Final    [facility]HS CV CPX PATIENT IS MALE 03/13/2020 1   Final    [facility]HS CV CPX PATIENT IS FEMALE 03/13/2020 0   Final    dose 03/13/2020 0.4  mg Final    Peak HR 03/13/2020 80.0  bpm Final    Peak Systolic BP 03/13/2020 120.0  mmHg Final    Peak Diatolic BP 03/13/2020 73.0  mmHg Final    Peak RPP  03/13/2020 9,600   Final    % Max HR Achieved 03/13/2020 51   Final       EKG   See above    Echo   No results found for this or any previous visit.    Imaging  No results found.    Prior coronary angiogram / intervention:   See above    Assessment and Plan  1. Coronary artery disease involving native coronary artery of native heart without angina pectoris   Continue aspirin with high-dose statin Zetia beta-blocker    2. Essential hypertension   Continue hydrochlorothiazide, losartan, metoprolol    3. Hyperlipidemia, unspecified hyperlipidemia type   Continue statin Zetia    4. Chronic deep vein thrombosis (DVT) of distal vein of lower extremity, unspecified laterality   Appears to have been below-the-knee and he received anticoagulation for 3 months   Repeat ultrasound      Follow Up  6 months      Simeon Newton MD, [facility], [facility]  Interventional Cardiology     Total professional time spent for the encounter: 30 minutes  Time was spent preparing to see the patient, reviewing results of prior testing, obtaining and/or reviewing separately obtained history, performing a medically appropriate examination and interview, counseling and educating the patient/family, ordering medications/tests/procedures, referring and communicating with other health care professionals, documenting clinical information in the electronic health record, and independently interpreting results.

## 2024-11-05 DIAGNOSIS — I25.10 CORONARY ARTERY DISEASE INVOLVING NATIVE CORONARY ARTERY OF NATIVE HEART WITHOUT ANGINA PECTORIS: ICD-10-CM

## 2024-11-05 RX ORDER — METOPROLOL SUCCINATE 25 MG/1
25 TABLET, EXTENDED RELEASE ORAL
Qty: 90 TABLET | Refills: 3 | OUTPATIENT
Start: 2024-11-05

## 2024-11-05 NOTE — TELEPHONE ENCOUNTER
Received refill request for metoprolol from [facility].      Spoke with patient, informed that Dr. Newton resigned form [employer].  He will need to contact [facility] to have them fax refill request to his PCP.  Patient verbalized understanding.  Refill request denied.

## 2024-12-06 ENCOUNTER — OFFICE VISIT (OUTPATIENT)
Dept: CARDIOLOGY | Facility: CLINIC | Age: 69
End: 2024-12-06
Payer: MEDICARE

## 2024-12-06 VITALS
SYSTOLIC BLOOD PRESSURE: 120 MMHG | HEART RATE: 76 BPM | BODY MASS INDEX: 41.09 KG/M2 | HEIGHT: 70 IN | DIASTOLIC BLOOD PRESSURE: 78 MMHG | WEIGHT: 287 LBS | OXYGEN SATURATION: 96 %

## 2024-12-06 DIAGNOSIS — Z98.61 S/P PTCA (PERCUTANEOUS TRANSLUMINAL CORONARY ANGIOPLASTY): ICD-10-CM

## 2024-12-06 DIAGNOSIS — E78.2 MIXED HYPERLIPIDEMIA: Primary | ICD-10-CM

## 2024-12-06 DIAGNOSIS — I25.2 OLD MI (MYOCARDIAL INFARCTION): ICD-10-CM

## 2024-12-06 DIAGNOSIS — I82.5Z9 CHRONIC DEEP VEIN THROMBOSIS (DVT) OF DISTAL VEIN OF LOWER EXTREMITY, UNSPECIFIED LATERALITY: ICD-10-CM

## 2024-12-06 DIAGNOSIS — I10 ESSENTIAL HYPERTENSION: ICD-10-CM

## 2024-12-06 DIAGNOSIS — E66.01 MORBID OBESITY: ICD-10-CM

## 2024-12-06 DIAGNOSIS — E11.9 DIABETES MELLITUS WITHOUT COMPLICATION: ICD-10-CM

## 2024-12-06 DIAGNOSIS — I25.10 CORONARY ARTERY DISEASE INVOLVING NATIVE CORONARY ARTERY OF NATIVE HEART WITHOUT ANGINA PECTORIS: ICD-10-CM

## 2024-12-06 PROCEDURE — 99215 OFFICE O/P EST HI 40 MIN: CPT | Mod: S$GLB,,,

## 2024-12-06 PROCEDURE — G2211 COMPLEX E/M VISIT ADD ON: HCPCS | Mod: S$GLB,,,

## 2024-12-06 PROCEDURE — 3288F FALL RISK ASSESSMENT DOCD: CPT | Mod: CPTII,S$GLB,,

## 2024-12-06 PROCEDURE — 3074F SYST BP LT 130 MM HG: CPT | Mod: CPTII,S$GLB,,

## 2024-12-06 PROCEDURE — 3008F BODY MASS INDEX DOCD: CPT | Mod: CPTII,S$GLB,,

## 2024-12-06 PROCEDURE — 1126F AMNT PAIN NOTED NONE PRSNT: CPT | Mod: CPTII,S$GLB,,

## 2024-12-06 PROCEDURE — 4010F ACE/ARB THERAPY RXD/TAKEN: CPT | Mod: CPTII,S$GLB,,

## 2024-12-06 PROCEDURE — 3078F DIAST BP <80 MM HG: CPT | Mod: CPTII,S$GLB,,

## 2024-12-06 PROCEDURE — 99999 PR PBB SHADOW E&M-EST. PATIENT-LVL III: CPT | Mod: PBBFAC,,,

## 2024-12-06 PROCEDURE — 1101F PT FALLS ASSESS-DOCD LE1/YR: CPT | Mod: CPTII,S$GLB,,

## 2024-12-06 RX ORDER — TADALAFIL 20 MG/1
20 TABLET ORAL NIGHTLY
Qty: 10 TABLET | Refills: 11 | Status: SHIPPED | OUTPATIENT
Start: 2024-12-06

## 2024-12-06 RX ORDER — ATORVASTATIN CALCIUM 40 MG/1
40 TABLET, FILM COATED ORAL DAILY
Qty: 90 TABLET | Refills: 3 | Status: SHIPPED | OUTPATIENT
Start: 2024-12-06

## 2024-12-06 RX ORDER — ASPIRIN 81 MG/1
81 TABLET ORAL DAILY
Qty: 90 TABLET | Refills: 3 | Status: SHIPPED | OUTPATIENT
Start: 2024-12-06

## 2024-12-06 RX ORDER — HYDROCHLOROTHIAZIDE 12.5 MG/1
12.5 CAPSULE ORAL EVERY MORNING
Qty: 90 CAPSULE | Refills: 3 | Status: SHIPPED | OUTPATIENT
Start: 2024-12-06

## 2024-12-06 RX ORDER — EZETIMIBE 10 MG/1
10 TABLET ORAL NIGHTLY
Qty: 90 TABLET | Refills: 3 | Status: SHIPPED | OUTPATIENT
Start: 2024-12-06 | End: 2025-12-06

## 2024-12-06 RX ORDER — METOPROLOL SUCCINATE 25 MG/1
25 TABLET, EXTENDED RELEASE ORAL DAILY
Qty: 90 TABLET | Refills: 3 | Status: SHIPPED | OUTPATIENT
Start: 2024-12-06 | End: 2025-12-06

## 2024-12-06 RX ORDER — ERGOCALCIFEROL 1.25 MG/1
50000 CAPSULE ORAL
COMMUNITY
Start: 2024-11-26

## 2024-12-06 RX ORDER — LOSARTAN POTASSIUM 50 MG/1
50 TABLET ORAL DAILY
Qty: 90 TABLET | Refills: 3 | Status: SHIPPED | OUTPATIENT
Start: 2024-12-06

## 2024-12-06 NOTE — PROGRESS NOTES
[facility] - Cardiology [address]  Cardiology Clinic Note      Chief Complaint  Chief Complaint   Patient presents with    Follow-up       HPI:  Mr. Toussaint is a stasis and renal male with a past medical history of type 2 diabetes mellitus, morbid obesity, old MI, CAD status post PTCA status post PCI 1999 and 2006 last treated ISR of LAD 2019 MPI 2020 no defect normal estimated EF, chronic DVT distal vein of lower extremity    Patient is new to me and here to establish care  Previously seen by Dr. Newton  Lipids at goal   Last visit added beta-blocker and discontinued amlodipine  Asymptomatic    Medications  Current Outpatient Medications   Medication Sig Dispense Refill    ergocalciferol (ERGOCALCIFEROL) 50,000 unit Cap Take 50,000 Units by mouth every 7 days.      metFORMIN (GLUCOPHAGE-XR) 500 MG 24 hr tablet Take 500 mg by mouth once daily.  1    MOUNJARO 2.5 mg/0.5 mL PnIj Inject 2.5 mg into the skin every 7 days.      pantoprazole (PROTONIX) 20 MG tablet Take 20 mg by mouth once daily.  0    albuterol (PROVENTIL/VENTOLIN HFA) 90 mcg/actuation inhaler Inhale 2 puffs into the lungs every 4 (four) hours as needed.      aspirin (ECOTRIN) 81 MG EC tablet Take 1 tablet (81 mg total) by mouth once daily. 90 tablet 3    atorvastatin (LIPITOR) 40 MG tablet Take 1 tablet (40 mg total) by mouth once daily. 90 tablet 3    ezetimibe (ZETIA) 10 mg tablet Take 1 tablet (10 mg total) by mouth every evening. 90 tablet 3    fluticasone propionate (FLONASE) 50 mcg/actuation nasal spray       gabapentin (NEURONTIN) 300 MG capsule 300 mg daily as needed.  2    hydroCHLOROthiazide (MICROZIDE) 12.5 mg capsule Take 1 capsule (12.5 mg total) by mouth every morning. 90 capsule 3    ibuprofen (ADVIL,MOTRIN) 800 MG tablet as needed.       latanoprost 0.005 % ophthalmic solution Apply 2 drops to eye.      losartan (COZAAR) 50 MG tablet Take 1 tablet (50 mg total) by mouth once daily. 90 tablet 3    metoprolol succinate (TOPROL-XL) 25 MG 24  hr tablet Take 1 tablet (25 mg total) by mouth once daily. 90 tablet 3    tadalafiL (CIALIS) 20 MG Tab Take 1 tablet (20 mg total) by mouth nightly. 10 tablet 11     No current facility-administered medications for this visit.        History  Past Medical History:   Diagnosis Date    Coronary artery disease     Hyperlipidemia     Hypertension     MI (myocardial infarction)          Past Surgical History:   Procedure Laterality Date    CARDIAC CATHETERIZATION      CORONARY ANGIOPLASTY       at [facility],   at [facility] in [place]    HERNIA REPAIR Right     1990  inguinal     Social History     Socioeconomic History    Marital status:    Tobacco Use    Smoking status: Former     Current packs/day: 0.00     Types: Cigarettes     Quit date: 10/10/2016     Years since quittin.2    Smokeless tobacco: Never   Substance and Sexual Activity    Alcohol use: Yes     Alcohol/week: 0.0 standard drinks of alcohol     Family History   Problem Relation Name Age of Onset    Breast cancer Mother      Hypertension Sister      Heart disease Brother      Coronary artery disease Brother      Hypertension Brother          Allergies  Review of patient's allergies indicates:  No Known Allergies    Review of Systems   Review of Systems   Constitutional: Negative for chills, decreased appetite, diaphoresis, fever, malaise/fatigue, weight gain and weight loss.   Eyes:  Negative for blurred vision.   Cardiovascular:  Negative for chest pain, claudication, dyspnea on exertion, irregular heartbeat, leg swelling, near-syncope, orthopnea, palpitations, paroxysmal nocturnal dyspnea and syncope.   Respiratory:  Negative for cough, shortness of breath, snoring, sputum production and wheezing.    Endocrine: Negative for cold intolerance, heat intolerance, polydipsia, polyphagia and polyuria.   Skin:  Negative for color change, dry skin, itching, nail changes and poor wound healing.   Musculoskeletal:  Negative for back pain,  gout, joint pain and joint swelling.   Gastrointestinal:  Negative for bloating, abdominal pain, constipation, diarrhea, hematemesis, hematochezia, melena, nausea and vomiting.   Genitourinary:  Negative for dysuria and hematuria.   Neurological:  Negative for dizziness, headaches, light-headedness, numbness, paresthesias and weakness.   Psychiatric/Behavioral:  Negative for altered mental status, depression and memory loss.        Physical Exam  Vitals:    12/06/24 1412   BP: 120/78   Pulse: 76     Wt Readings from Last 1 Encounters:   12/06/24 130.2 kg (287 lb)     Physical Exam  HENT:      Head: Normocephalic and atraumatic.      Mouth/Throat:      Mouth: Mucous membranes are moist.   Eyes:      Extraocular Movements: Extraocular movements intact.      Pupils: Pupils are equal, round, and reactive to light.   Neck:      Vascular: No carotid bruit.   Cardiovascular:      Heart sounds: No murmur heard.     No friction rub. No gallop.   Pulmonary:      Effort: Pulmonary effort is normal. No respiratory distress.   Abdominal:      General: Abdomen is flat.      Palpations: Abdomen is soft.   Musculoskeletal:      Right lower leg: No edema.      Left lower leg: No edema.   Skin:     General: Skin is warm.      Capillary Refill: Capillary refill takes less than 2 seconds.   Neurological:      General: No focal deficit present.      Mental Status: He is alert.   Psychiatric:         Mood and Affect: Mood normal.         Labs  No visits with results within 6 Month(s) from this visit.   Latest known visit with results is:   Lab Visit on 05/06/2024   Component Date Value Ref Range Status    D-Dimer 05/06/2024 0.98 (H)  <0.50 mg/L FEU Final    Comment: The quantitative D-dimer assay should be used as an aid in   the diagnosis of deep vein thrombosis and pulmonary embolism  in patients with the appropriate presentation and clinical  history. The upper limit of the reference interval and the clinical   cut off   point are  identical. Causes of a positive (>0.50 mg/L FEU) D-Dimer   test  include, but are not limited to: DVT, PE, DIC, thrombolytic   therapy, anticoagulant therapy, recent surgery, trauma, or   pregnancy, disseminated malignancy, aortic aneurysm, cirrhosis,  and severe infection. False negative results may occur in   patients with distal DVT.  BIANCA^612^^[id number]^  LOT^[id number]         EKG  Reviewed    Echo   No results found for this or any previous visit.    Imaging  No results found.    Prior coronary angiogram / intervention:  As above    Assessment and Plan  Mr. Toussaint is a stasis and renal male with a past medical history of type 2 diabetes mellitus, morbid obesity, old MI, CAD status post PTCA status post PCI 1999 and 2006 last treated ISR of LAD 2019 MPI 2020 no defect normal estimated EF, chronic DVT distal vein of lower extremity    Coronary artery disease involving native coronary artery of native heart without angina pectoris  Old MI  S/P PTCA (percutaneous transluminal coronary angioplasty)  Continue aspirin with high-dose statin Zetia beta-blocker     Essential hypertension  Continue hydrochlorothiazide, losartan, metoprolol     Hyperlipidemia, unspecified hyperlipidemia type  Continue statin Zetia  Repeat lipid panel     Chronic deep vein thrombosis (DVT) of distal vein of lower extremity, unspecified laterality  Appears to have been below-the-knee and he received anticoagulation for 3 months  Repeat ultrasound  Completed  Occlusion of 1 of the left peroneal veins which is a calf vein.  Report from 02/03/2019 reports left peroneal vein thrombus.  This may be chronic in nature although images are not available for review.  No other thrombus is visualized.    Diabetes mellitus without complication  Controlled  Managed per PCP  - aspirin (ECOTRIN) 81 MG EC tablet; Take 1 tablet (81 mg total) by mouth once daily.  Dispense: 90 tablet; Refill: 3  - atorvastatin (LIPITOR) 40 MG  tablet; Take 1 tablet (40 mg total) by mouth once daily.  Dispense: 90 tablet; Refill: 3  - hydroCHLOROthiazide (MICROZIDE) 12.5 mg capsule; Take 1 capsule (12.5 mg total) by mouth every morning.  Dispense: 90 capsule; Refill: 3  - losartan (COZAAR) 50 MG tablet; Take 1 tablet (50 mg total) by mouth once daily.  Dispense: 90 tablet; Refill: 3  - metoprolol succinate (TOPROL-XL) 25 MG 24 hr tablet; Take 1 tablet (25 mg total) by mouth once daily.  Dispense: 90 tablet; Refill: 3  - tadalafiL (CIALIS) 20 MG Tab; Take 1 tablet (20 mg total) by mouth nightly.  Dispense: 10 tablet; Refill: 11    Morbid obesity  Encouraged a heart healthy diet that is low in saturated fats and sodium   Also encouraged an increase in activities as tolerated for healthy weight management   Discussed Mediterranean Diet recommendations (Adopted from Katia et al, [place], 2018.)  - Eat primarily plant-based foods, such as fruits/vegetables, whole grains, legumes & nuts  - Limit refined carbohydrates (white pasta, bread, rice).  - Replace butter with healthy fats such as olive oil.  - Use herbs and spices instead of salt to flavor foods.  - Limit red meat and processed meats to no more than a few times a month.  - Avoid sugary sodas, bakery goods, and sweets.  - Eat fish and poultry at least twice a week.              - Get plenty of exercise (150 minutes per week).    - aspirin (ECOTRIN) 81 MG EC tablet; Take 1 tablet (81 mg total) by mouth once daily.  Dispense: 90 tablet; Refill: 3  - atorvastatin (LIPITOR) 40 MG tablet; Take 1 tablet (40 mg total) by mouth once daily.  Dispense: 90 tablet; Refill: 3  - hydroCHLOROthiazide (MICROZIDE) 12.5 mg capsule; Take 1 capsule (12.5 mg total) by mouth every morning.  Dispense: 90 capsule; Refill: 3  - losartan (COZAAR) 50 MG tablet; Take 1 tablet (50 mg total) by mouth once daily.  Dispense: 90 tablet; Refill: 3  - metoprolol succinate (TOPROL-XL) 25 MG 24 hr tablet; Take 1 tablet (25 mg total) by mouth  once daily.  Dispense: 90 tablet; Refill: 3  - tadalafiL (CIALIS) 20 MG Tab; Take 1 tablet (20 mg total) by mouth nightly.  Dispense: 10 tablet; Refill: 11    Follow Up  Follow up in about 6 months (around 6/6/2025), or if symptoms worsen or fail to improve.       Brandi R. Carter, FNP-C [facility] - Cardiology    Total professional time spent for the encounter: 40 minutes  Time was spent preparing to see the patient, reviewing results of prior testing, obtaining and/or reviewing separately obtained history, performing a medically appropriate examination and interview, counseling and educating the patient/family, ordering medications/tests/procedures, referring and communicating with other health care professionals, documenting clinical information in the electronic health record, and independently interpreting results.